# Patient Record
Sex: FEMALE | Race: BLACK OR AFRICAN AMERICAN | Employment: OTHER | URBAN - METROPOLITAN AREA
[De-identification: names, ages, dates, MRNs, and addresses within clinical notes are randomized per-mention and may not be internally consistent; named-entity substitution may affect disease eponyms.]

---

## 2017-10-03 ENCOUNTER — HOSPITAL ENCOUNTER (OUTPATIENT)
Dept: MAMMOGRAPHY | Age: 72
Discharge: HOME | End: 2017-10-03
Attending: FAMILY MEDICINE
Payer: MEDICARE

## 2017-10-03 DIAGNOSIS — Z12.31 VISIT FOR SCREENING MAMMOGRAM: ICD-10-CM

## 2017-10-03 PROCEDURE — 77067 SCR MAMMO BI INCL CAD: CPT

## 2019-03-11 ENCOUNTER — HOSPITAL ENCOUNTER (OUTPATIENT)
Dept: ULTRASOUND IMAGING | Age: 74
Discharge: HOME | End: 2019-03-11
Attending: FAMILY MEDICINE
Payer: MEDICARE

## 2019-03-11 DIAGNOSIS — M79.604 RIGHT LEG PAIN: ICD-10-CM

## 2019-03-11 DIAGNOSIS — R60.9 SWELLING: ICD-10-CM

## 2019-03-11 PROCEDURE — 93971 EXTREMITY STUDY: CPT

## 2022-10-29 ENCOUNTER — APPOINTMENT (OUTPATIENT)
Dept: GENERAL RADIOLOGY | Age: 77
DRG: 493 | End: 2022-10-29
Attending: ORTHOPAEDIC SURGERY
Payer: MEDICARE

## 2022-10-29 ENCOUNTER — APPOINTMENT (OUTPATIENT)
Dept: GENERAL RADIOLOGY | Age: 77
DRG: 493 | End: 2022-10-29
Attending: EMERGENCY MEDICINE
Payer: MEDICARE

## 2022-10-29 ENCOUNTER — APPOINTMENT (OUTPATIENT)
Dept: CT IMAGING | Age: 77
DRG: 493 | End: 2022-10-29
Attending: EMERGENCY MEDICINE
Payer: MEDICARE

## 2022-10-29 ENCOUNTER — HOSPITAL ENCOUNTER (INPATIENT)
Age: 77
LOS: 3 days | Discharge: NURSING, REHAB OR LONG-TERM CARE | DRG: 493 | End: 2022-11-02
Attending: EMERGENCY MEDICINE | Admitting: STUDENT IN AN ORGANIZED HEALTH CARE EDUCATION/TRAINING PROGRAM
Payer: MEDICARE

## 2022-10-29 DIAGNOSIS — S82.831A CLOSED FRACTURE OF DISTAL END OF RIGHT FIBULA, UNSPECIFIED FRACTURE MORPHOLOGY, INITIAL ENCOUNTER: Primary | ICD-10-CM

## 2022-10-29 DIAGNOSIS — R06.02 SOB (SHORTNESS OF BREATH): ICD-10-CM

## 2022-10-29 DIAGNOSIS — S82.891A CLOSED FRACTURE OF RIGHT ANKLE, INITIAL ENCOUNTER: ICD-10-CM

## 2022-10-29 DIAGNOSIS — R07.9 CHEST PAIN, UNSPECIFIED TYPE: ICD-10-CM

## 2022-10-29 DIAGNOSIS — R03.0 ELEVATED BLOOD PRESSURE READING: ICD-10-CM

## 2022-10-29 LAB
ALBUMIN SERPL-MCNC: 2.7 G/DL (ref 3.5–5)
ALBUMIN/GLOB SERPL: 0.5 (ref 1.1–2.2)
ALP SERPL-CCNC: 84 U/L (ref 45–117)
ALT SERPL-CCNC: 29 U/L (ref 12–78)
ANION GAP SERPL CALC-SCNC: 8 MMOL/L (ref 5–15)
AST SERPL-CCNC: 34 U/L (ref 15–37)
BASOPHILS # BLD: 0 K/UL (ref 0–0.1)
BASOPHILS NFR BLD: 1 % (ref 0–1)
BILIRUB SERPL-MCNC: 0.9 MG/DL (ref 0.2–1)
BNP SERPL-MCNC: 343 PG/ML
BUN SERPL-MCNC: 13 MG/DL (ref 6–20)
BUN/CREAT SERPL: 16 (ref 12–20)
CALCIUM SERPL-MCNC: 10 MG/DL (ref 8.5–10.1)
CHLORIDE SERPL-SCNC: 98 MMOL/L (ref 97–108)
CO2 SERPL-SCNC: 34 MMOL/L (ref 21–32)
CREAT SERPL-MCNC: 0.8 MG/DL (ref 0.55–1.02)
DIFFERENTIAL METHOD BLD: ABNORMAL
EOSINOPHIL # BLD: 0 K/UL (ref 0–0.4)
EOSINOPHIL NFR BLD: 0 % (ref 0–7)
ERYTHROCYTE [DISTWIDTH] IN BLOOD BY AUTOMATED COUNT: 13.1 % (ref 11.5–14.5)
GLOBULIN SER CALC-MCNC: 5 G/DL (ref 2–4)
GLUCOSE SERPL-MCNC: 117 MG/DL (ref 65–100)
HCT VFR BLD AUTO: 35.7 % (ref 35–47)
HGB BLD-MCNC: 11.3 G/DL (ref 11.5–16)
IMM GRANULOCYTES # BLD AUTO: 0 K/UL (ref 0–0.04)
IMM GRANULOCYTES NFR BLD AUTO: 0 % (ref 0–0.5)
LYMPHOCYTES # BLD: 1 K/UL (ref 0.8–3.5)
LYMPHOCYTES NFR BLD: 12 % (ref 12–49)
MCH RBC QN AUTO: 28 PG (ref 26–34)
MCHC RBC AUTO-ENTMCNC: 31.7 G/DL (ref 30–36.5)
MCV RBC AUTO: 88.4 FL (ref 80–99)
MONOCYTES # BLD: 0.6 K/UL (ref 0–1)
MONOCYTES NFR BLD: 7 % (ref 5–13)
NEUTS SEG # BLD: 6.8 K/UL (ref 1.8–8)
NEUTS SEG NFR BLD: 80 % (ref 32–75)
NRBC # BLD: 0 K/UL (ref 0–0.01)
NRBC BLD-RTO: 0 PER 100 WBC
PLATELET # BLD AUTO: 210 K/UL (ref 150–400)
PMV BLD AUTO: 11.7 FL (ref 8.9–12.9)
POTASSIUM SERPL-SCNC: 3.1 MMOL/L (ref 3.5–5.1)
PROT SERPL-MCNC: 7.7 G/DL (ref 6.4–8.2)
RBC # BLD AUTO: 4.04 M/UL (ref 3.8–5.2)
SODIUM SERPL-SCNC: 140 MMOL/L (ref 136–145)
TROPONIN-HIGH SENSITIVITY: 11 NG/L (ref 0–51)
WBC # BLD AUTO: 8.5 K/UL (ref 3.6–11)

## 2022-10-29 PROCEDURE — 73610 X-RAY EXAM OF ANKLE: CPT

## 2022-10-29 PROCEDURE — 85025 COMPLETE CBC W/AUTO DIFF WBC: CPT

## 2022-10-29 PROCEDURE — 71045 X-RAY EXAM CHEST 1 VIEW: CPT

## 2022-10-29 PROCEDURE — 94761 N-INVAS EAR/PLS OXIMETRY MLT: CPT

## 2022-10-29 PROCEDURE — 36415 COLL VENOUS BLD VENIPUNCTURE: CPT

## 2022-10-29 PROCEDURE — 93005 ELECTROCARDIOGRAM TRACING: CPT

## 2022-10-29 PROCEDURE — 84484 ASSAY OF TROPONIN QUANT: CPT

## 2022-10-29 PROCEDURE — 99285 EMERGENCY DEPT VISIT HI MDM: CPT

## 2022-10-29 PROCEDURE — 74011250636 HC RX REV CODE- 250/636: Performed by: EMERGENCY MEDICINE

## 2022-10-29 PROCEDURE — 83880 ASSAY OF NATRIURETIC PEPTIDE: CPT

## 2022-10-29 PROCEDURE — 80053 COMPREHEN METABOLIC PANEL: CPT

## 2022-10-29 PROCEDURE — 96374 THER/PROPH/DIAG INJ IV PUSH: CPT

## 2022-10-29 RX ORDER — MORPHINE SULFATE 2 MG/ML
4 INJECTION, SOLUTION INTRAMUSCULAR; INTRAVENOUS
Status: DISPENSED | OUTPATIENT
Start: 2022-10-29 | End: 2022-10-30

## 2022-10-29 RX ORDER — NALOXONE HYDROCHLORIDE 0.4 MG/ML
0.4 INJECTION, SOLUTION INTRAMUSCULAR; INTRAVENOUS; SUBCUTANEOUS AS NEEDED
Status: DISCONTINUED | OUTPATIENT
Start: 2022-10-29 | End: 2022-11-02 | Stop reason: HOSPADM

## 2022-10-29 RX ORDER — OXYCODONE HYDROCHLORIDE 5 MG/1
2.5 TABLET ORAL
Status: DISCONTINUED | OUTPATIENT
Start: 2022-10-29 | End: 2022-11-02 | Stop reason: HOSPADM

## 2022-10-29 RX ORDER — FENTANYL CITRATE 50 UG/ML
50 INJECTION, SOLUTION INTRAMUSCULAR; INTRAVENOUS
Status: COMPLETED | OUTPATIENT
Start: 2022-10-29 | End: 2022-10-29

## 2022-10-29 RX ORDER — ACETAMINOPHEN 325 MG/1
650 TABLET ORAL EVERY 6 HOURS
Status: DISCONTINUED | OUTPATIENT
Start: 2022-10-29 | End: 2022-11-02 | Stop reason: HOSPADM

## 2022-10-29 RX ORDER — SODIUM CHLORIDE 0.9 % (FLUSH) 0.9 %
5-40 SYRINGE (ML) INJECTION AS NEEDED
Status: DISCONTINUED | OUTPATIENT
Start: 2022-10-29 | End: 2022-10-30

## 2022-10-29 RX ORDER — SODIUM CHLORIDE 0.9 % (FLUSH) 0.9 %
5-40 SYRINGE (ML) INJECTION EVERY 8 HOURS
Status: DISCONTINUED | OUTPATIENT
Start: 2022-10-29 | End: 2022-10-30

## 2022-10-29 RX ORDER — AMOXICILLIN 250 MG
2 CAPSULE ORAL 2 TIMES DAILY
Status: DISCONTINUED | OUTPATIENT
Start: 2022-10-30 | End: 2022-11-02 | Stop reason: HOSPADM

## 2022-10-29 RX ORDER — OXYCODONE HYDROCHLORIDE 5 MG/1
5 TABLET ORAL
Status: DISCONTINUED | OUTPATIENT
Start: 2022-10-29 | End: 2022-11-02 | Stop reason: HOSPADM

## 2022-10-29 RX ADMIN — FENTANYL CITRATE 50 MCG: 50 INJECTION INTRAMUSCULAR; INTRAVENOUS at 21:16

## 2022-10-29 NOTE — ED NOTES
Assumed care of pt. Pt reports a fall a week ago, was seen at the time-right ankle break, given walking boot, still have bilat feet pain unable to bear wt on either foot with generalized weakness and fatigue and loss of appetite not eaten since yesterday and having increased short of air-hx of asthma does not feel the same and using inhaler with no relief.  Pt AOX4, PWD, VSS, pain 8/10 in bilat feet, right foot/ankle swollen-pedal pulses strong and equal.

## 2022-10-30 ENCOUNTER — APPOINTMENT (OUTPATIENT)
Dept: CT IMAGING | Age: 77
DRG: 493 | End: 2022-10-30
Attending: EMERGENCY MEDICINE
Payer: MEDICARE

## 2022-10-30 ENCOUNTER — APPOINTMENT (OUTPATIENT)
Dept: GENERAL RADIOLOGY | Age: 77
DRG: 493 | End: 2022-10-30
Attending: ORTHOPAEDIC SURGERY
Payer: MEDICARE

## 2022-10-30 ENCOUNTER — ANESTHESIA EVENT (OUTPATIENT)
Dept: SURGERY | Age: 77
DRG: 493 | End: 2022-10-30
Payer: MEDICARE

## 2022-10-30 ENCOUNTER — ANESTHESIA (OUTPATIENT)
Dept: SURGERY | Age: 77
DRG: 493 | End: 2022-10-30
Payer: MEDICARE

## 2022-10-30 PROBLEM — S82.891A CLOSED RIGHT ANKLE FRACTURE: Status: ACTIVE | Noted: 2022-10-30

## 2022-10-30 LAB
ABO + RH BLD: NORMAL
ANION GAP SERPL CALC-SCNC: 7 MMOL/L (ref 5–15)
APPEARANCE UR: CLEAR
ATRIAL RATE: 68 BPM
BACTERIA URNS QL MICRO: ABNORMAL /HPF
BASOPHILS # BLD: 0 K/UL (ref 0–0.1)
BASOPHILS NFR BLD: 1 % (ref 0–1)
BILIRUB UR QL CFM: NEGATIVE
BLOOD GROUP ANTIBODIES SERPL: NORMAL
BUN SERPL-MCNC: 14 MG/DL (ref 6–20)
BUN/CREAT SERPL: 17 (ref 12–20)
CALCIUM SERPL-MCNC: 10.2 MG/DL (ref 8.5–10.1)
CALCULATED P AXIS, ECG09: 48 DEGREES
CALCULATED R AXIS, ECG10: 15 DEGREES
CALCULATED T AXIS, ECG11: -84 DEGREES
CHLORIDE SERPL-SCNC: 98 MMOL/L (ref 97–108)
CO2 SERPL-SCNC: 35 MMOL/L (ref 21–32)
COLOR UR: ABNORMAL
CREAT SERPL-MCNC: 0.81 MG/DL (ref 0.55–1.02)
DIAGNOSIS, 93000: NORMAL
DIFFERENTIAL METHOD BLD: ABNORMAL
EOSINOPHIL # BLD: 0 K/UL (ref 0–0.4)
EOSINOPHIL NFR BLD: 1 % (ref 0–7)
EPITH CASTS URNS QL MICRO: ABNORMAL /LPF
ERYTHROCYTE [DISTWIDTH] IN BLOOD BY AUTOMATED COUNT: 13.2 % (ref 11.5–14.5)
GLUCOSE SERPL-MCNC: 98 MG/DL (ref 65–100)
GLUCOSE UR STRIP.AUTO-MCNC: NEGATIVE MG/DL
HCT VFR BLD AUTO: 35.6 % (ref 35–47)
HGB BLD-MCNC: 11.1 G/DL (ref 11.5–16)
HGB UR QL STRIP: NEGATIVE
HYALINE CASTS URNS QL MICRO: ABNORMAL /LPF (ref 0–2)
IMM GRANULOCYTES # BLD AUTO: 0 K/UL (ref 0–0.04)
IMM GRANULOCYTES NFR BLD AUTO: 0 % (ref 0–0.5)
INR PPP: 1.1 (ref 0.9–1.1)
KETONES UR QL STRIP.AUTO: 15 MG/DL
LEUKOCYTE ESTERASE UR QL STRIP.AUTO: NEGATIVE
LYMPHOCYTES # BLD: 1.4 K/UL (ref 0.8–3.5)
LYMPHOCYTES NFR BLD: 18 % (ref 12–49)
MAGNESIUM SERPL-MCNC: 1.8 MG/DL (ref 1.6–2.4)
MCH RBC QN AUTO: 28.4 PG (ref 26–34)
MCHC RBC AUTO-ENTMCNC: 31.2 G/DL (ref 30–36.5)
MCV RBC AUTO: 91 FL (ref 80–99)
MONOCYTES # BLD: 0.7 K/UL (ref 0–1)
MONOCYTES NFR BLD: 9 % (ref 5–13)
NEUTS SEG # BLD: 5.9 K/UL (ref 1.8–8)
NEUTS SEG NFR BLD: 71 % (ref 32–75)
NITRITE UR QL STRIP.AUTO: NEGATIVE
NRBC # BLD: 0 K/UL (ref 0–0.01)
NRBC BLD-RTO: 0 PER 100 WBC
P-R INTERVAL, ECG05: 134 MS
PH UR STRIP: 5.5 (ref 5–8)
PLATELET # BLD AUTO: 214 K/UL (ref 150–400)
PMV BLD AUTO: 12.1 FL (ref 8.9–12.9)
POTASSIUM SERPL-SCNC: 3.4 MMOL/L (ref 3.5–5.1)
PROT UR STRIP-MCNC: ABNORMAL MG/DL
PROTHROMBIN TIME: 11.4 SEC (ref 9–11.1)
Q-T INTERVAL, ECG07: 412 MS
QRS DURATION, ECG06: 70 MS
QTC CALCULATION (BEZET), ECG08: 438 MS
RBC # BLD AUTO: 3.91 M/UL (ref 3.8–5.2)
RBC #/AREA URNS HPF: ABNORMAL /HPF (ref 0–5)
SODIUM SERPL-SCNC: 140 MMOL/L (ref 136–145)
SP GR UR REFRACTOMETRY: 1.02 (ref 1–1.03)
SPECIMEN EXP DATE BLD: NORMAL
UA: UC IF INDICATED,UAUC: ABNORMAL
UROBILINOGEN UR QL STRIP.AUTO: 1 EU/DL (ref 0.2–1)
VENTRICULAR RATE, ECG03: 68 BPM
WBC # BLD AUTO: 8.1 K/UL (ref 3.6–11)
WBC URNS QL MICRO: ABNORMAL /HPF (ref 0–4)

## 2022-10-30 PROCEDURE — C1713 ANCHOR/SCREW BN/BN,TIS/BN: HCPCS | Performed by: ORTHOPAEDIC SURGERY

## 2022-10-30 PROCEDURE — 76000 FLUOROSCOPY <1 HR PHYS/QHP: CPT

## 2022-10-30 PROCEDURE — 77030003862 HC BIT DRL SYNT -B: Performed by: ORTHOPAEDIC SURGERY

## 2022-10-30 PROCEDURE — 74011250637 HC RX REV CODE- 250/637: Performed by: ORTHOPAEDIC SURGERY

## 2022-10-30 PROCEDURE — 76010000138 HC OR TIME 0.5 TO 1 HR: Performed by: ORTHOPAEDIC SURGERY

## 2022-10-30 PROCEDURE — 74011250637 HC RX REV CODE- 250/637: Performed by: NURSE PRACTITIONER

## 2022-10-30 PROCEDURE — 77030026438 HC STYL ET INTUB CARD -A: Performed by: STUDENT IN AN ORGANIZED HEALTH CARE EDUCATION/TRAINING PROGRAM

## 2022-10-30 PROCEDURE — 2709999900 HC NON-CHARGEABLE SUPPLY: Performed by: ORTHOPAEDIC SURGERY

## 2022-10-30 PROCEDURE — 80048 BASIC METABOLIC PNL TOTAL CA: CPT

## 2022-10-30 PROCEDURE — 76060000032 HC ANESTHESIA 0.5 TO 1 HR: Performed by: ORTHOPAEDIC SURGERY

## 2022-10-30 PROCEDURE — 77030013079 HC BLNKT BAIR HGGR 3M -A: Performed by: STUDENT IN AN ORGANIZED HEALTH CARE EDUCATION/TRAINING PROGRAM

## 2022-10-30 PROCEDURE — 86900 BLOOD TYPING SEROLOGIC ABO: CPT

## 2022-10-30 PROCEDURE — 76210000006 HC OR PH I REC 0.5 TO 1 HR: Performed by: ORTHOPAEDIC SURGERY

## 2022-10-30 PROCEDURE — 77030039497 HC CST PAD STERILE CHCS -A: Performed by: ORTHOPAEDIC SURGERY

## 2022-10-30 PROCEDURE — 99231 SBSQ HOSP IP/OBS SF/LOW 25: CPT | Performed by: ORTHOPAEDIC SURGERY

## 2022-10-30 PROCEDURE — 74011000258 HC RX REV CODE- 258: Performed by: ORTHOPAEDIC SURGERY

## 2022-10-30 PROCEDURE — 77030008771 HC TU NG SALEM SUMP -A: Performed by: STUDENT IN AN ORGANIZED HEALTH CARE EDUCATION/TRAINING PROGRAM

## 2022-10-30 PROCEDURE — 74011250636 HC RX REV CODE- 250/636: Performed by: STUDENT IN AN ORGANIZED HEALTH CARE EDUCATION/TRAINING PROGRAM

## 2022-10-30 PROCEDURE — 74011250637 HC RX REV CODE- 250/637: Performed by: STUDENT IN AN ORGANIZED HEALTH CARE EDUCATION/TRAINING PROGRAM

## 2022-10-30 PROCEDURE — 0QSJ04Z REPOSITION RIGHT FIBULA WITH INTERNAL FIXATION DEVICE, OPEN APPROACH: ICD-10-PCS | Performed by: ORTHOPAEDIC SURGERY

## 2022-10-30 PROCEDURE — 77030031139 HC SUT VCRL2 J&J -A: Performed by: ORTHOPAEDIC SURGERY

## 2022-10-30 PROCEDURE — 74011250636 HC RX REV CODE- 250/636: Performed by: ORTHOPAEDIC SURGERY

## 2022-10-30 PROCEDURE — 36415 COLL VENOUS BLD VENIPUNCTURE: CPT

## 2022-10-30 PROCEDURE — 77030002916 HC SUT ETHLN J&J -A: Performed by: ORTHOPAEDIC SURGERY

## 2022-10-30 PROCEDURE — 77030008684 HC TU ET CUF COVD -B: Performed by: STUDENT IN AN ORGANIZED HEALTH CARE EDUCATION/TRAINING PROGRAM

## 2022-10-30 PROCEDURE — 81001 URINALYSIS AUTO W/SCOPE: CPT

## 2022-10-30 PROCEDURE — 74011000250 HC RX REV CODE- 250: Performed by: STUDENT IN AN ORGANIZED HEALTH CARE EDUCATION/TRAINING PROGRAM

## 2022-10-30 PROCEDURE — 65270000029 HC RM PRIVATE

## 2022-10-30 PROCEDURE — 73600 X-RAY EXAM OF ANKLE: CPT

## 2022-10-30 PROCEDURE — 71275 CT ANGIOGRAPHY CHEST: CPT

## 2022-10-30 PROCEDURE — 74011250637 HC RX REV CODE- 250/637: Performed by: EMERGENCY MEDICINE

## 2022-10-30 PROCEDURE — 74011000636 HC RX REV CODE- 636: Performed by: STUDENT IN AN ORGANIZED HEALTH CARE EDUCATION/TRAINING PROGRAM

## 2022-10-30 PROCEDURE — 83735 ASSAY OF MAGNESIUM: CPT

## 2022-10-30 PROCEDURE — 3E0T3BZ INTRODUCTION OF ANESTHETIC AGENT INTO PERIPHERAL NERVES AND PLEXI, PERCUTANEOUS APPROACH: ICD-10-PCS | Performed by: ORTHOPAEDIC SURGERY

## 2022-10-30 PROCEDURE — 77030000032 HC CUF TRNQT ZIMM -B: Performed by: ORTHOPAEDIC SURGERY

## 2022-10-30 PROCEDURE — 85610 PROTHROMBIN TIME: CPT

## 2022-10-30 PROCEDURE — 85025 COMPLETE CBC W/AUTO DIFF WBC: CPT

## 2022-10-30 DEVICE — 3.5MM STARDRIVE CORTEX SCREW SELF-TAPPING 14MM: Type: IMPLANTABLE DEVICE | Site: ANKLE | Status: FUNCTIONAL

## 2022-10-30 DEVICE — 3.5MM STARDRIVE CORTEX SCREW SELF-TAPPING 12MM: Type: IMPLANTABLE DEVICE | Site: ANKLE | Status: FUNCTIONAL

## 2022-10-30 DEVICE — 3.5MM STARDRIVE CORTEX SCREW SELF-TAPPING 22MM: Type: IMPLANTABLE DEVICE | Site: ANKLE | Status: FUNCTIONAL

## 2022-10-30 DEVICE — LCP ONE-THIRD TUBULAR PLATE WITH COLLAR 7 HOLES/81MM
Type: IMPLANTABLE DEVICE | Site: ANKLE | Status: FUNCTIONAL
Brand: LCP

## 2022-10-30 DEVICE — SCREW BNE L50MM DIA3.5MM CORT S STL ST FULL THRD LOK T15: Type: IMPLANTABLE DEVICE | Site: ANKLE | Status: FUNCTIONAL

## 2022-10-30 DEVICE — 4.0MM CANCELLOUS BONE SCREW FULLY THREADED/16MM: Type: IMPLANTABLE DEVICE | Site: ANKLE | Status: FUNCTIONAL

## 2022-10-30 RX ORDER — ONDANSETRON 2 MG/ML
INJECTION INTRAMUSCULAR; INTRAVENOUS AS NEEDED
Status: DISCONTINUED | OUTPATIENT
Start: 2022-10-30 | End: 2022-10-30 | Stop reason: HOSPADM

## 2022-10-30 RX ORDER — FENTANYL CITRATE 50 UG/ML
25 INJECTION, SOLUTION INTRAMUSCULAR; INTRAVENOUS
Status: DISCONTINUED | OUTPATIENT
Start: 2022-10-30 | End: 2022-10-30 | Stop reason: HOSPADM

## 2022-10-30 RX ORDER — POTASSIUM CHLORIDE 750 MG/1
20 TABLET, FILM COATED, EXTENDED RELEASE ORAL
Status: COMPLETED | OUTPATIENT
Start: 2022-10-30 | End: 2022-10-30

## 2022-10-30 RX ORDER — LIDOCAINE HYDROCHLORIDE 20 MG/ML
INJECTION, SOLUTION EPIDURAL; INFILTRATION; INTRACAUDAL; PERINEURAL AS NEEDED
Status: DISCONTINUED | OUTPATIENT
Start: 2022-10-30 | End: 2022-10-30 | Stop reason: HOSPADM

## 2022-10-30 RX ORDER — SODIUM CHLORIDE, SODIUM LACTATE, POTASSIUM CHLORIDE, CALCIUM CHLORIDE 600; 310; 30; 20 MG/100ML; MG/100ML; MG/100ML; MG/100ML
INJECTION, SOLUTION INTRAVENOUS
Status: DISCONTINUED | OUTPATIENT
Start: 2022-10-30 | End: 2022-10-30 | Stop reason: HOSPADM

## 2022-10-30 RX ORDER — ACETAMINOPHEN 325 MG/1
650 TABLET ORAL
Status: DISCONTINUED | OUTPATIENT
Start: 2022-10-30 | End: 2022-11-02 | Stop reason: HOSPADM

## 2022-10-30 RX ORDER — ACETAMINOPHEN 650 MG/1
650 SUPPOSITORY RECTAL
Status: DISCONTINUED | OUTPATIENT
Start: 2022-10-30 | End: 2022-11-02 | Stop reason: HOSPADM

## 2022-10-30 RX ORDER — TRIAMTERENE/HYDROCHLOROTHIAZID 37.5-25 MG
1 TABLET ORAL DAILY
Status: DISCONTINUED | OUTPATIENT
Start: 2022-10-30 | End: 2022-11-02 | Stop reason: HOSPADM

## 2022-10-30 RX ORDER — MAGNESIUM SULFATE 1 G/100ML
1 INJECTION INTRAVENOUS ONCE
Status: COMPLETED | OUTPATIENT
Start: 2022-10-30 | End: 2022-10-30

## 2022-10-30 RX ORDER — SODIUM CHLORIDE 0.9 % (FLUSH) 0.9 %
5-40 SYRINGE (ML) INJECTION EVERY 8 HOURS
Status: DISCONTINUED | OUTPATIENT
Start: 2022-10-30 | End: 2022-11-02 | Stop reason: HOSPADM

## 2022-10-30 RX ORDER — FENTANYL CITRATE 50 UG/ML
INJECTION, SOLUTION INTRAMUSCULAR; INTRAVENOUS AS NEEDED
Status: DISCONTINUED | OUTPATIENT
Start: 2022-10-30 | End: 2022-10-30 | Stop reason: HOSPADM

## 2022-10-30 RX ORDER — EPHEDRINE SULFATE/0.9% NACL/PF 50 MG/5 ML
SYRINGE (ML) INTRAVENOUS AS NEEDED
Status: DISCONTINUED | OUTPATIENT
Start: 2022-10-30 | End: 2022-10-30 | Stop reason: HOSPADM

## 2022-10-30 RX ORDER — HYDROMORPHONE HYDROCHLORIDE 1 MG/ML
0.2 INJECTION, SOLUTION INTRAMUSCULAR; INTRAVENOUS; SUBCUTANEOUS
Status: DISCONTINUED | OUTPATIENT
Start: 2022-10-30 | End: 2022-10-30 | Stop reason: HOSPADM

## 2022-10-30 RX ORDER — ENOXAPARIN SODIUM 100 MG/ML
30 INJECTION SUBCUTANEOUS 2 TIMES DAILY
Status: DISCONTINUED | OUTPATIENT
Start: 2022-10-30 | End: 2022-11-02 | Stop reason: HOSPADM

## 2022-10-30 RX ORDER — ALBUTEROL SULFATE 0.83 MG/ML
2.5 SOLUTION RESPIRATORY (INHALATION)
Status: DISCONTINUED | OUTPATIENT
Start: 2022-10-30 | End: 2022-11-02 | Stop reason: HOSPADM

## 2022-10-30 RX ORDER — PROPOFOL 10 MG/ML
INJECTION, EMULSION INTRAVENOUS AS NEEDED
Status: DISCONTINUED | OUTPATIENT
Start: 2022-10-30 | End: 2022-10-30 | Stop reason: HOSPADM

## 2022-10-30 RX ORDER — ROPIVACAINE HYDROCHLORIDE 5 MG/ML
INJECTION, SOLUTION EPIDURAL; INFILTRATION; PERINEURAL AS NEEDED
Status: DISCONTINUED | OUTPATIENT
Start: 2022-10-30 | End: 2022-10-30 | Stop reason: HOSPADM

## 2022-10-30 RX ORDER — ALBUTEROL SULFATE 90 UG/1
2 AEROSOL, METERED RESPIRATORY (INHALATION)
Status: DISCONTINUED | OUTPATIENT
Start: 2022-10-30 | End: 2022-10-30 | Stop reason: CLARIF

## 2022-10-30 RX ORDER — POTASSIUM CHLORIDE 20 MEQ/1
20 TABLET, EXTENDED RELEASE ORAL ONCE
Status: COMPLETED | OUTPATIENT
Start: 2022-10-30 | End: 2022-10-30

## 2022-10-30 RX ORDER — POLYETHYLENE GLYCOL 3350 17 G/17G
17 POWDER, FOR SOLUTION ORAL DAILY PRN
Status: DISCONTINUED | OUTPATIENT
Start: 2022-10-30 | End: 2022-11-02 | Stop reason: HOSPADM

## 2022-10-30 RX ORDER — FENTANYL CITRATE 50 UG/ML
INJECTION, SOLUTION INTRAMUSCULAR; INTRAVENOUS
Status: DISPENSED
Start: 2022-10-30 | End: 2022-10-30

## 2022-10-30 RX ORDER — CEFAZOLIN SODIUM 1 G/3ML
INJECTION, POWDER, FOR SOLUTION INTRAMUSCULAR; INTRAVENOUS AS NEEDED
Status: DISCONTINUED | OUTPATIENT
Start: 2022-10-30 | End: 2022-10-30 | Stop reason: HOSPADM

## 2022-10-30 RX ORDER — DEXMEDETOMIDINE HYDROCHLORIDE 100 UG/ML
INJECTION, SOLUTION INTRAVENOUS AS NEEDED
Status: DISCONTINUED | OUTPATIENT
Start: 2022-10-30 | End: 2022-10-30 | Stop reason: HOSPADM

## 2022-10-30 RX ORDER — NADOLOL 40 MG/1
40 TABLET ORAL DAILY
Status: DISCONTINUED | OUTPATIENT
Start: 2022-10-30 | End: 2022-11-02 | Stop reason: HOSPADM

## 2022-10-30 RX ORDER — ONDANSETRON 4 MG/1
4 TABLET, ORALLY DISINTEGRATING ORAL
Status: DISCONTINUED | OUTPATIENT
Start: 2022-10-30 | End: 2022-11-02 | Stop reason: HOSPADM

## 2022-10-30 RX ORDER — WATER FOR INJECTION,STERILE
VIAL (ML) INJECTION
Status: DISPENSED
Start: 2022-10-30 | End: 2022-10-30

## 2022-10-30 RX ORDER — CEFAZOLIN SODIUM 1 G/3ML
INJECTION, POWDER, FOR SOLUTION INTRAMUSCULAR; INTRAVENOUS
Status: COMPLETED
Start: 2022-10-30 | End: 2022-10-30

## 2022-10-30 RX ORDER — PHENYLEPHRINE HCL IN 0.9% NACL 0.4MG/10ML
SYRINGE (ML) INTRAVENOUS AS NEEDED
Status: DISCONTINUED | OUTPATIENT
Start: 2022-10-30 | End: 2022-10-30 | Stop reason: HOSPADM

## 2022-10-30 RX ORDER — OXYCODONE HYDROCHLORIDE 5 MG/1
5 TABLET ORAL AS NEEDED
Status: DISCONTINUED | OUTPATIENT
Start: 2022-10-30 | End: 2022-10-30 | Stop reason: HOSPADM

## 2022-10-30 RX ORDER — ROCURONIUM BROMIDE 10 MG/ML
INJECTION, SOLUTION INTRAVENOUS AS NEEDED
Status: DISCONTINUED | OUTPATIENT
Start: 2022-10-30 | End: 2022-10-30 | Stop reason: HOSPADM

## 2022-10-30 RX ORDER — ONDANSETRON 2 MG/ML
4 INJECTION INTRAMUSCULAR; INTRAVENOUS
Status: DISCONTINUED | OUTPATIENT
Start: 2022-10-30 | End: 2022-11-02 | Stop reason: HOSPADM

## 2022-10-30 RX ORDER — ROPIVACAINE HYDROCHLORIDE 5 MG/ML
INJECTION, SOLUTION EPIDURAL; INFILTRATION; PERINEURAL
Status: COMPLETED | OUTPATIENT
Start: 2022-10-30 | End: 2022-10-30

## 2022-10-30 RX ORDER — ONDANSETRON 2 MG/ML
4 INJECTION INTRAMUSCULAR; INTRAVENOUS AS NEEDED
Status: DISCONTINUED | OUTPATIENT
Start: 2022-10-30 | End: 2022-10-30 | Stop reason: HOSPADM

## 2022-10-30 RX ORDER — ACETAMINOPHEN 500 MG
1000 TABLET ORAL ONCE
Status: DISCONTINUED | OUTPATIENT
Start: 2022-10-30 | End: 2022-10-30 | Stop reason: HOSPADM

## 2022-10-30 RX ORDER — SUCCINYLCHOLINE CHLORIDE 20 MG/ML
INJECTION INTRAMUSCULAR; INTRAVENOUS AS NEEDED
Status: DISCONTINUED | OUTPATIENT
Start: 2022-10-30 | End: 2022-10-30 | Stop reason: HOSPADM

## 2022-10-30 RX ORDER — SODIUM CHLORIDE 0.9 % (FLUSH) 0.9 %
5-40 SYRINGE (ML) INJECTION AS NEEDED
Status: DISCONTINUED | OUTPATIENT
Start: 2022-10-30 | End: 2022-11-02 | Stop reason: HOSPADM

## 2022-10-30 RX ADMIN — ONDANSETRON HYDROCHLORIDE 4 MG: 2 INJECTION, SOLUTION INTRAMUSCULAR; INTRAVENOUS at 09:45

## 2022-10-30 RX ADMIN — ACETAMINOPHEN 650 MG: 325 TABLET ORAL at 17:33

## 2022-10-30 RX ADMIN — SODIUM CHLORIDE, SODIUM LACTATE, POTASSIUM CHLORIDE, AND CALCIUM CHLORIDE: 600; 310; 30; 20 INJECTION, SOLUTION INTRAVENOUS at 09:45

## 2022-10-30 RX ADMIN — ACETAMINOPHEN 650 MG: 325 TABLET ORAL at 00:08

## 2022-10-30 RX ADMIN — CEFAZOLIN 2 G: 1 INJECTION, POWDER, FOR SOLUTION INTRAMUSCULAR; INTRAVENOUS; PARENTERAL at 09:59

## 2022-10-30 RX ADMIN — FENTANYL CITRATE 50 MCG: 50 INJECTION, SOLUTION INTRAMUSCULAR; INTRAVENOUS at 09:52

## 2022-10-30 RX ADMIN — Medication 3 AMPULE: at 08:55

## 2022-10-30 RX ADMIN — SUGAMMADEX 300 MG: 100 INJECTION, SOLUTION INTRAVENOUS at 10:36

## 2022-10-30 RX ADMIN — SODIUM CHLORIDE, PRESERVATIVE FREE 10 ML: 5 INJECTION INTRAVENOUS at 05:33

## 2022-10-30 RX ADMIN — CEFAZOLIN 3 G: 10 INJECTION, POWDER, FOR SOLUTION INTRAVENOUS at 17:42

## 2022-10-30 RX ADMIN — SODIUM CHLORIDE, PRESERVATIVE FREE 10 ML: 5 INJECTION INTRAVENOUS at 21:44

## 2022-10-30 RX ADMIN — ROCURONIUM BROMIDE 5 MG: 10 INJECTION INTRAVENOUS at 09:52

## 2022-10-30 RX ADMIN — SODIUM CHLORIDE, PRESERVATIVE FREE 10 ML: 5 INJECTION INTRAVENOUS at 14:43

## 2022-10-30 RX ADMIN — OXYCODONE 5 MG: 5 TABLET ORAL at 21:42

## 2022-10-30 RX ADMIN — OXYCODONE 5 MG: 5 TABLET ORAL at 11:35

## 2022-10-30 RX ADMIN — ACETAMINOPHEN 650 MG: 325 TABLET ORAL at 05:33

## 2022-10-30 RX ADMIN — TRIAMTERENE AND HYDROCHLOROTHIAZIDE 1 TABLET: 37.5; 25 TABLET ORAL at 08:33

## 2022-10-30 RX ADMIN — POTASSIUM CHLORIDE 20 MEQ: 750 TABLET, FILM COATED, EXTENDED RELEASE ORAL at 05:45

## 2022-10-30 RX ADMIN — SENNOSIDES AND DOCUSATE SODIUM 2 TABLET: 50; 8.6 TABLET ORAL at 08:33

## 2022-10-30 RX ADMIN — FENTANYL CITRATE 50 MCG: 50 INJECTION, SOLUTION INTRAMUSCULAR; INTRAVENOUS at 10:04

## 2022-10-30 RX ADMIN — DEXMEDETOMIDINE HYDROCHLORIDE 4 MCG: 100 INJECTION, SOLUTION, CONCENTRATE INTRAVENOUS at 10:02

## 2022-10-30 RX ADMIN — Medication 200 MCG: at 09:58

## 2022-10-30 RX ADMIN — MAGNESIUM SULFATE HEPTAHYDRATE 1 G: 1 INJECTION, SOLUTION INTRAVENOUS at 05:51

## 2022-10-30 RX ADMIN — ROPIVACAINE HYDROCHLORIDE 20 ML: 5 INJECTION, SOLUTION EPIDURAL; INFILTRATION; PERINEURAL at 09:20

## 2022-10-30 RX ADMIN — IOPAMIDOL 100 ML: 755 INJECTION, SOLUTION INTRAVENOUS at 01:44

## 2022-10-30 RX ADMIN — ENOXAPARIN SODIUM 30 MG: 100 INJECTION SUBCUTANEOUS at 21:43

## 2022-10-30 RX ADMIN — PROPOFOL 140 MG: 10 INJECTION, EMULSION INTRAVENOUS at 09:52

## 2022-10-30 RX ADMIN — LIDOCAINE HYDROCHLORIDE 60 MG: 20 INJECTION, SOLUTION EPIDURAL; INFILTRATION; INTRACAUDAL; PERINEURAL at 09:52

## 2022-10-30 RX ADMIN — SUCCINYLCHOLINE CHLORIDE 150 MG: 20 INJECTION, SOLUTION INTRAMUSCULAR; INTRAVENOUS at 09:52

## 2022-10-30 RX ADMIN — SUGAMMADEX 100 MG: 100 INJECTION, SOLUTION INTRAVENOUS at 10:38

## 2022-10-30 RX ADMIN — POTASSIUM CHLORIDE 20 MEQ: 1500 TABLET, EXTENDED RELEASE ORAL at 08:33

## 2022-10-30 RX ADMIN — PROPOFOL 25 MG: 10 INJECTION, EMULSION INTRAVENOUS at 09:15

## 2022-10-30 RX ADMIN — FENTANYL CITRATE 25 MCG: 50 INJECTION INTRAMUSCULAR; INTRAVENOUS at 11:21

## 2022-10-30 RX ADMIN — ACETAMINOPHEN 650 MG: 325 TABLET ORAL at 23:47

## 2022-10-30 RX ADMIN — Medication 5 MG: at 10:00

## 2022-10-30 RX ADMIN — POTASSIUM BICARBONATE 50 MEQ: 391 TABLET, EFFERVESCENT ORAL at 00:07

## 2022-10-30 RX ADMIN — ACETAMINOPHEN 650 MG: 325 TABLET ORAL at 12:11

## 2022-10-30 RX ADMIN — Medication 1 AMPULE: at 23:49

## 2022-10-30 RX ADMIN — NADOLOL 40 MG: 40 TABLET ORAL at 08:33

## 2022-10-30 RX ADMIN — POLYETHYLENE GLYCOL 3350 17 G: 17 POWDER, FOR SOLUTION ORAL at 15:09

## 2022-10-30 RX ADMIN — SENNOSIDES AND DOCUSATE SODIUM 2 TABLET: 50; 8.6 TABLET ORAL at 17:33

## 2022-10-30 NOTE — ANESTHESIA PROCEDURE NOTES
Peripheral Block    Start time: 10/30/2022 9:10 AM  End time: 10/30/2022 9:20 AM  Performed by: Ngoc Tate MD  Authorized by: Ngoc Tate MD       Pre-procedure: Indications: at surgeon's request and post-op pain management    Preanesthetic Checklist: patient identified, risks and benefits discussed, site marked, timeout performed, anesthesia consent given and patient being monitored    Timeout Time: 09:08 EDT      Block Type:   Block Type:  Popliteal  Laterality:  Right  Monitoring:  Standard ASA monitoring, continuous pulse ox, frequent vital sign checks, heart rate, responsive to questions and oxygen  Injection Technique:  Single shot  Procedures: ultrasound guided and nerve stimulator    Patient Position: supine  Prep: chlorhexidine    : 10 cm above the popliteal fossa & tibial tuberosity. Needle Type:  Stimuplex  Needle Gauge:  22 G  Needle Localization:  Ultrasound guidance and nerve stimulator  Medication Injected:  Ropivacaine (PF) (NAROPIN)(0.5%) 5 mg/mL injection - Peripheral Nerve Block   20 mL - 10/30/2022 9:20:00 AM  Med Admin Time: 10/30/2022 9:26 AM    Assessment:  Number of attempts:  1  Injection Assessment:  Incremental injection every 5 mL, local visualized surrounding nerve on ultrasound, negative aspiration for blood, no intravascular symptoms and no paresthesia  Patient tolerance:  Patient tolerated the procedure well with no immediate complications  Under ultrasound guidance, a 22 gauge needle was inserted and placed in close proximity to the nerve. Ultrasound was also used to visualize the spread of the anesthetic in close proximity to the nerve being blocked. The nerve appeared anatomicaly normal.  A permanent ultrasound image was saved in the patient's record.

## 2022-10-30 NOTE — H&P
This note is compiled to communicate with the medical care team. It may contain sensitive and protected information. It is not intended to serve as a source of communication with patients/families/friends; that communication occurs at the bedside or via alternative direct communications means (secure messaging, letters, video/telephone, etc.). Hospitalist Admission Note    NAME: Bala Chadwick   :  1945   MRN:  [id number]     Date/Time:  10/30/2022 1:18 AM    Patient PCP: Korina De Paz MD  ______________________________________________________________________  Given the patient's current clinical presentation, I have a high level of concern for decompensation if discharged from the emergency department. Complex decision making was performed, which includes reviewing the patient's available past medical records, laboratory results, and x-ray films. My assessment of this patient's clinical condition and my plan of care is as follows. Subjective:   CHIEF COMPLAINT: Ankle pain    HISTORY OF PRESENT ILLNESS:     Johnna Jo is a 78 y.o.  female with pertinent past medical history as listed below who presents with complaints of right greater than left ankle pain. Patient reports experiencing mechanical fall 1 week prior at which time she was evaluated at outside emergency department found to have right ankle fracture and was placed in walking boot, but reports this was too uncomfortable to tolerate and has remained in bed for the past week as she reports inability to bear weight on either ankle secondary to pain. Today, patient reports episode of shortness of breath with central pain of moderate intensity lasting approximately 1 hour prior to spontaneously resolving. At present, patient reports no chest discomfort or change from baseline respiratory status. ROS otherwise negative.     In the ED, patient afebrile and hemodynamically stable (hypertensive 150s/90s) saturating upper 90s on room air. ECG demonstrates normal sinus rhythm without definitive ischemic changes. CXR demonstrates no acute process. Right ankle radiographs demonstrate fracture with dislocation status post splinting which demonstrates lateral malleolus fracture with widened medial mortise piece. Left ankle radiographs demonstrate no acute abnormality. Orthopedics consulted in the ED recommending medicine admission with tentative plans for ORIF in the morning. Labs demonstrate: Unremarkable CBC, proBNP 343, sodium 140, potassium 3.1, CO2 34 (chronic), BUN 13, creatinine 0.80, high sensitive troponin 11, INR 1.1. CTA chest pending radiologist read. We were asked to admit for work up and evaluation of the above problems. Past Medical History:   Diagnosis Date    Arthritis     bursitis    Asthma     Chronic pain     feet,hips    Family history of colon cancer 2/12/2015    mother    GERD (gastroesophageal reflux disease)     H/O bone density study 7/30/12    nml    Hyperlipidemia 5/8/2012    Hypertension     Sleep apnea     no cpap        Past Surgical History:   Procedure Laterality Date    COLONOSCOPY  2006    nml    EGD  2006    HX CHOLECYSTECTOMY  08/18/2016    Lap alber    HX DILATION AND CURETTAGE      HX PEPPER AND BSO      HX TONSILLECTOMY      HX TUBAL LIGATION      KRISHNA MAMMOGRAM SCREENING BILATERAL  7/30/12       Social History     Tobacco Use    Smoking status: Never    Smokeless tobacco: Never   Substance Use Topics    Alcohol use:  Yes     Alcohol/week: 1.0 standard drink     Types: 1 Glasses of wine per week     Comment: rare/every 6 months        Family History   Problem Relation Age of Onset    Cancer Mother 80        BREAST    Hypertension Mother     Breast Cancer Mother     Hypertension Father     Stroke Father     Cancer Sister 76        BREAST    Mult Sclerosis Sister     Breast Cancer Sister     Diabetes Other     Alcohol abuse Neg Hx     OSTEOARTHRITIS Neg Hx Asthma Neg Hx     Bleeding Prob Neg Hx     Elevated Lipids Neg Hx     Headache Neg Hx     Heart Disease Neg Hx     Lung Disease Neg Hx     Migraines Neg Hx     Psychiatric Disorder Neg Hx     Mental Retardation Neg Hx        Allergies   Allergen Reactions    Sulfa (Sulfonamide Antibiotics) Rash        Prior to Admission medications    Medication Sig Start Date End Date Taking? Authorizing Provider   oxyCODONE-acetaminophen (PERCOCET) 5-325 mg per tablet Take 1-2 Tabs by mouth every four (4) hours as needed for Pain. Max Daily Amount: 12 Tabs. 8/18/16  Yes Armand VILLALTA MD   naproxen sodium 220 mg cap Take  by mouth as needed. Yes Provider, Historical   triamterene-hydrochlorothiazide (DYAZIDE) 37.5-25 mg per capsule Take 1 Cap by mouth daily. 7/2/13  Yes Marcella Salguero MD   nadolol (CORGARD) 40 mg tablet Take 1 Tab by mouth daily. 7/2/13  Yes Marcella Salguero MD   albuterol (PROVENTIL, VENTOLIN) 90 mcg/actuation inhaler Take 2 Puffs by inhalation every four (4) hours as needed. 5/14/13  Yes Marcella Salguero MD   promethazine (PHENERGAN) 12.5 mg tablet Take 1 Tab by mouth every six (6) hours as needed for Nausea.  8/18/16   Tova Baguh MD       REVIEW OF SYSTEMS:       Total of 12 systems reviewed as follows:       POSITIVE= Red text   General: Fever, chills, sweats, weakness/malaise, weight loss/gain, loss of appetite    Eyes:   Vision change, eye pain   ENT:    Rhinorrhea, pharyngitis, Hearing loss    Respiratory:   cough, sputum production, SOB, LOPEZ, wheezing, pleuritic pain    Cardiology:   chest pain, palpitations, orthopnea, edema, syncope    Gastrointestinal:  abdominal pain , N/V, diarrhea, dysphagia, constipation, bleeding    Genitourinary:  frequency, urgency, dysuria, hematuria, incontinence    Muskuloskeletal:  arthralgia, myalgia, back pain   Hematology:  easy bruising, nose or gum bleeding, lymphadenopathy    Dermatological: rash, ulceration, pruritis, color change / jaundice   Endocrine: hot flashes or polydipsia    Neurological:  headache, dizziness, confusion, focal weakness, paresthesia, Speech difficulties, memory loss, gait difficulty   Psychological: Feelings of anxiety, depression, agitation       Objective:   VITALS:    Visit Vitals  BP (!) 152/93   Pulse 70   Temp 98.9 °F (37.2 °C)   Resp 19   Ht 5' 3\" (1.6 m)   Wt 122.2 kg (269 lb 6.4 oz)   SpO2 100%   BMI 47.72 kg/m²       PHYSICAL EXAM:    General:   Alert, cooperative, no distress, morbidly obese elderly -American female        HEENT:  Atraumatic, anicteric sclerae, pink conjunctivae, mucosa moist, dentition fair, partial dentures removed     Neck:  Supple, symmetrical, trachea midline, no abnormalities on palpation     Lungs:   CTA B, diminished breath sounds in bases. Symmetric expansion. Good aeration. Normal respiratory effort. Chest wall:  No tenderness. No Accessory muscle use. Cardiovascular:   RRR, No murmur/rub/gallop. No JVD. Radial/AT/DP pulse 2+     GI/:   Protuberant/obese, soft, non-tender. Not distended. Bowel sounds normal. No HSM     Extremities No edema. No cyanosis. Capillary refill normal, RLE in splint exposed toes neurovascularly intact, LLE neurovascularly intact diffusely tender on palpation of ankle joint line most notably overlying ATFL and pain is elicited with posterior drawer and ankle inversion testing no joint instability appreciated     Skin: No significant lesions noted. Not Jaundiced. No rashes      Neurologic: PERRL. EOMI. No facial asymmetry. No aphasia or slurred speech. Moves all extremities. Sensation to light touch grossly intact BUE/BLE. No overt focal deficits appreciated     Psych:  Alert and oriented X 4. Normal affect.  Good insight     Other:   N/A         LAB DATA REVIEWED:    Recent Results (from the past 24 hour(s))   CBC WITH AUTOMATED DIFF    Collection Time: 10/29/22  8:43 PM   Result Value Ref Range    WBC 8.5 3.6 - 11.0 K/uL    RBC 4.04 3.80 - 5.20 M/uL    HGB 11.3 (L) 11.5 - 16.0 g/dL    HCT 35.7 35.0 - 47.0 %    MCV 88.4 80.0 - 99.0 FL    MCH 28.0 26.0 - 34.0 PG    MCHC 31.7 30.0 - 36.5 g/dL    RDW 13.1 11.5 - 14.5 %    PLATELET 016 953 - 195 K/uL    MPV 11.7 8.9 - 12.9 FL    NRBC 0.0 0  WBC    ABSOLUTE NRBC 0.00 0.00 - 0.01 K/uL    NEUTROPHILS 80 (H) 32 - 75 %    LYMPHOCYTES 12 12 - 49 %    MONOCYTES 7 5 - 13 %    EOSINOPHILS 0 0 - 7 %    BASOPHILS 1 0 - 1 %    IMMATURE GRANULOCYTES 0 0.0 - 0.5 %    ABS. NEUTROPHILS 6.8 1.8 - 8.0 K/UL    ABS. LYMPHOCYTES 1.0 0.8 - 3.5 K/UL    ABS. MONOCYTES 0.6 0.0 - 1.0 K/UL    ABS. EOSINOPHILS 0.0 0.0 - 0.4 K/UL    ABS. BASOPHILS 0.0 0.0 - 0.1 K/UL    ABS. IMM. GRANS. 0.0 0.00 - 0.04 K/UL    DF AUTOMATED     METABOLIC PANEL, COMPREHENSIVE    Collection Time: 10/29/22  8:43 PM   Result Value Ref Range    Sodium 140 136 - 145 mmol/L    Potassium 3.1 (L) 3.5 - 5.1 mmol/L    Chloride 98 97 - 108 mmol/L    CO2 34 (H) 21 - 32 mmol/L    Anion gap 8 5 - 15 mmol/L    Glucose 117 (H) 65 - 100 mg/dL    BUN 13 6 - 20 MG/DL    Creatinine 0.80 0.55 - 1.02 MG/DL    BUN/Creatinine ratio 16 12 - 20      eGFR >60 >60 ml/min/1.73m2    Calcium 10.0 8.5 - 10.1 MG/DL    Bilirubin, total 0.9 0.2 - 1.0 MG/DL    ALT (SGPT) 29 12 - 78 U/L    AST (SGOT) 34 15 - 37 U/L    Alk.  phosphatase 84 45 - 117 U/L    Protein, total 7.7 6.4 - 8.2 g/dL    Albumin 2.7 (L) 3.5 - 5.0 g/dL    Globulin 5.0 (H) 2.0 - 4.0 g/dL    A-G Ratio 0.5 (L) 1.1 - 2.2     TROPONIN-HIGH SENSITIVITY    Collection Time: 10/29/22  8:43 PM   Result Value Ref Range    Troponin-High Sensitivity 11 0 - 51 ng/L   NT-PRO BNP    Collection Time: 10/29/22  8:43 PM   Result Value Ref Range    NT pro- <450 PG/ML   EKG, 12 LEAD, INITIAL    Collection Time: 10/29/22  8:54 PM   Result Value Ref Range    Ventricular Rate 68 BPM    Atrial Rate 68 BPM    P-R Interval 134 ms    QRS Duration 70 ms    Q-T Interval 412 ms    QTC Calculation (Bezet) 438 ms    Calculated P Axis 48 degrees    Calculated R Axis 15 degrees    Calculated T Axis -84 degrees    Diagnosis       Normal sinus rhythm  ST & T wave abnormality, consider inferior ischemia  ST & T wave abnormality, consider anterolateral ischemia  When compared with ECG of 16-AUG-2016 11:07,  Inverted T waves have replaced nonspecific T wave abnormality in Anterior   leads     PROTHROMBIN TIME + INR    Collection Time: 10/30/22 12:28 AM   Result Value Ref Range    INR 1.1 0.9 - 1.1      Prothrombin time 11.4 (H) 9.0 - 11.1 sec       IMAGING:  CXR-no acute process on portable chest    Left ankle radiographs-no acute abnormality    Right ankle radiographs-fracture dislocation    Repeat right ankle radiographs-lateral malleolus fracture with widened medial which he is redemonstrated and fiberglass splint    EKG: Normal sinus rhythm with nonspecific T wave changes, rate 68, , QTc 438  ______________________________________________________________________    Assessment / Plan:  Right ankle fracture  Suspected left ankle sprain of ATFL  Hypokalemia  Obesity class III by BMI 47.7 with suspected obesity hypoventilation syndrome  History asthma  RENEA not on CPAP  Essential hypertension    PLAN:    Admit to medicine  Follow-up CTA chest to ensure no PE  Orthopedics consulted, greatly appreciate their expertise  -N.p.o. and DVT chemoprophylaxis held in anticipation of ORIF  -Defer DVT chemoprophylaxis and pain management to orthopedics  Continue PTA: Nadolol, Dyazide for hypertension  Albuterol inhaler every 4 hours as needed shortness of breath/wheezing  As needed MiraLAX for bowel regimen-escalate as needed while on opioids  Recheck BMP and check mag in the morning  -48 M EQ repletion ordered in ED  Patient declined inpatient CPAP-reports she has had 3 studies but is not prescribed a CPAP at present as she was instructed to come back for repeat testing-likely for bilevel evaluation  -Noted chronic bicarb elevation likely compensatory for chronic CO2 retention in setting of super morbid obesity and pickwickian syndrome    Surgical clearance:  RCRI: 0 points-3.9% 30-day risk of death, MI, or cardiac arrest  -Await CTA results to ensure no PE present and repeat BMP to ensure hypokalemia resolved prior to operative intervention; if both are acceptable operative risk deemed low/intermediate  -Has tolerated anesthesia in the past without issue-May be difficult intubation given Mallampati score 4, short thyroid mental distance, limited neck extension, and large body habitus    Code Status: Full    DVT Prophylaxis: Defer to orthopedics  GI Prophylaxis: Not indicated  Diet: N.p.o.       Care Plan discussed with:    Comments   Patient x    Family      RN     Care Manager                    Consultant:      _______________________________________________________________________  Baseline Level of Function: Independent    Expected  Disposition:   Home with Family    HH/PT/OT/RN    SNF/LTC x   JUAN    ________________________________________________________________________  TOTAL TIME:  50 Minutes      Comments    x Reviewed previous records   >50% of visit spent in counseling and coordination of care x Discussion with patient and/or family and questions answered       ________________________________________________________________________  Signed: Alexandra Austin DO  10/30/2022 1:18 AM    Please note that this documentation was completed in part with Dragon dictation software. Occasionally unanticipated grammatical, syntax, homophones, and other interpretive errors are inadvertently transcribed by the computer software. Please excuse and disregard any errors that have escaped final proofreading. If in doubt, please do not hesitate to reach out to myself or the assigned hospitalist via [facility] paging system for clarification.

## 2022-10-30 NOTE — PERIOP NOTES
TRANSFER - IN REPORT:    Verbal report received from Cori MEZA(name) on Guido Koroma  being received from 3255(unit) for ordered procedure      Report consisted of patients Situation, Background, Assessment and   Recommendations(SBAR). Information from the following report(s) SBAR, Intake/Output, MAR, and Recent Results was reviewed with the receiving nurse. Opportunity for questions and clarification was provided. Assessment completed upon patients arrival to unit and care assumed.

## 2022-10-30 NOTE — PROGRESS NOTES
TRANSFER - IN REPORT:    Verbal report received from Glenys Olson RN(name) on [employer]  being received from ED(unit) for routine progression of care      Report consisted of patients Situation, Background, Assessment and   Recommendations(SBAR). Information from the following report(s) SBAR, Kardex, ED Summary, Procedure Summary, Intake/Output, MAR, Recent Results, and Cardiac Rhythm NSR  was reviewed with the receiving nurse. Opportunity for questions and clarification was provided. Assessment completed upon patients arrival to unit and care assumed.

## 2022-10-30 NOTE — PROGRESS NOTES
Noted ortho recommendations and pt is scheduled for ankle fracture repair today. Will defer but continue to follow.

## 2022-10-30 NOTE — PROGRESS NOTES
Hospitalist Progress Note    NAME: Iliana Rushing   :  1945   MRN:  [id number]       Assessment / Plan:  Right ankle fracture  Suspected left ankle sprain of ATFL  -Appreciate Ortho input   -S/p ORIF today   -PT/OT consults in AM   -Case Management to assist with DC planning, will likely need SNF vs IPR   -Continue bowel regimen   -PRN pain management with oxycodone and morphine   -Encourage IS    Hypokalemia  -K 3.4  -Replete with PO   -Trend BMP     Obesity class III by BMI 47.7 with suspected obesity hypoventilation syndrome  History asthma  RENEA not on CPAP  -CTA ruled out PE  -Incidentally showed Chest wall structures show a 1.2 x 1.4 cm left axillary lymph node  Recommend OP follow up   -Noted chronic bicarb elevation likely compensatory for chronic CO2 retention in setting of super morbid obesity and pickwickian syndrome  Patient declined inpatient CPAP-reports she has had 3 studies but is not prescribed a CPAP at present as she was instructed to come back for repeat testing-likely for bilevel evaluation  -PRN nebs  -Monitor respiratory status    Essential hypertension  -Continue PTA: Nadolol, Dyazide for hypertension  -Monitor BP, slightly elevated this AM likely rlt to pain     40 or above Morbid obesity / Body mass index is 47.72 kg/m². Estimated discharge date:   Barriers: SNF vs IPR     Code status: Full  Prophylaxis: Lovenox  Recommended Disposition:  SNF vs IPR      Subjective:     Chief Complaint / Reason for Physician Visit  Patient seen at bedside post surgery. No complaints. Discussed plan of care as above, patient verbalized understanding/agreement. Discussed with RN events overnight.      Review of Systems:  Symptom Y/N Comments  Symptom Y/N Comments   Fever/Chills    Chest Pain     Poor Appetite    Edema     Cough    Abdominal Pain     Sputum    Joint Pain y    SOB/LOPEZ    Pruritis/Rash     Nausea/vomit    Tolerating PT/OT     Diarrhea    Tolerating Diet y    Constipation Other       Could NOT obtain due to:      Objective:     VITALS:   Last 24hrs VS reviewed since prior progress note. Most recent are:  Patient Vitals for the past 24 hrs:   Temp Pulse Resp BP SpO2   10/30/22 1600 97.3 °F (36.3 °C) 70 18 136/84 98 %   10/30/22 1500 98.2 °F (36.8 °C) 75 18 125/68 98 %   10/30/22 1400 98 °F (36.7 °C) 68 18 (!) 142/88 95 %   10/30/22 1300 97.9 °F (36.6 °C) 77 18 (!) 170/93 96 %   10/30/22 1202 97.9 °F (36.6 °C) 76 18 (!) 181/99 100 %   10/30/22 1135 99 °F (37.2 °C) 69 12 (!) 138/94 99 %   10/30/22 1130 -- 69 10 (!) 139/95 98 %   10/30/22 1120 -- 76 15 139/65 95 %   10/30/22 1115 -- 75 13 (!) 152/83 98 %   10/30/22 1105 -- 70 14 (!) 150/88 100 %   10/30/22 1100 -- 76 15 (!) 82/62 99 %   10/30/22 1055 -- 71 13 -- 99 %   10/30/22 1050 -- 72 20 120/80 99 %   10/30/22 1047 99 °F (37.2 °C) 69 16 131/85 100 %   10/30/22 1045 99 °F (37.2 °C) 66 12 131/85 100 %   10/30/22 0845 97.8 °F (36.6 °C) 69 19 135/77 99 %   10/30/22 0757 97.6 °F (36.4 °C) 68 16 128/65 100 %   10/30/22 0252 99.2 °F (37.3 °C) 74 20 (!) 164/84 96 %   10/29/22 2152 -- 70 -- (!) 152/93 100 %   10/29/22 2052 -- 69 -- (!) 146/105 100 %   10/29/22 2022 -- 64 -- 122/73 97 %   10/29/22 2007 -- 74 -- (!) 190/77 99 %   10/29/22 1952 -- 70 -- (!) 180/80 --   10/29/22 1937 -- 74 -- (!) 173/78 99 %   10/29/22 1922 98.9 °F (37.2 °C) 76 19 (!) 149/95 98 %       Intake/Output Summary (Last 24 hours) at 10/30/2022 1644  Last data filed at 10/30/2022 1041  Gross per 24 hour   Intake 500 ml   Output --   Net 500 ml        I had a face to face encounter and independently examined this patient on 10/30/2022, as outlined below:  PHYSICAL EXAM:  General: WD, WN. Alert, cooperative, no acute distress    EENT:  EOMI. Anicteric sclerae. MMM  Resp:  CTA bilaterally, no wheezing or rales. No accessory muscle use  CV:  Regular  rhythm,  No edema  GI:  Soft, obese, Non tender.   +Bowel sounds  Neurologic:  Alert and oriented X 3, normal speech, Psych:   Good insight. Not anxious nor agitated  Skin:  No rashes. No jaundice    Reviewed most current lab test results and cultures  YES  Reviewed most current radiology test results   YES  Review and summation of old records today    NO  Reviewed patient's current orders and MAR    YES  PMH/SH reviewed - no change compared to H&P  ________________________________________________________________________  Care Plan discussed with:    Comments   Patient x    Family      RN     Care Manager x    Consultant                        Multidiciplinary team rounds were held today with , nursing, pharmacist and clinical coordinator. Patient's plan of care was discussed; medications were reviewed and discharge planning was addressed. ________________________________________________________________________  Total NON critical care TIME:  35   Minutes    Total CRITICAL CARE TIME Spent:   Minutes non procedure based      Comments   >50% of visit spent in counseling and coordination of care x    ________________________________________________________________________  Chandler Alonzo NP     Procedures: see electronic medical records for all procedures/Xrays and details which were not copied into this note but were reviewed prior to creation of Plan. LABS:  I reviewed today's most current labs and imaging studies.   Pertinent labs include:  Recent Labs     10/30/22  0312 10/29/22  2043   WBC 8.1 8.5   HGB 11.1* 11.3*   HCT 35.6 35.7    210     Recent Labs     10/30/22  0312 10/30/22  0028 10/29/22  2043     --  140   K 3.4*  --  3.1*   CL 98  --  98   CO2 35*  --  34*   GLU 98  --  117*   BUN 14  --  13   CREA 0.81  --  0.80   CA 10.2*  --  10.0   MG 1.8  --   --    ALB  --   --  2.7*   TBILI  --   --  0.9   ALT  --   --  29   INR  --  1.1  --        Signed: Chandler Alonzo NP

## 2022-10-30 NOTE — ANESTHESIA PREPROCEDURE EVALUATION
Anesthetic History   No history of anesthetic complications            Review of Systems / Medical History  Patient summary reviewed, nursing notes reviewed and pertinent labs reviewed    Pulmonary        Sleep apnea: No treatment    Asthma : well controlled       Neuro/Psych   Within defined limits        Pertinent negatives: No seizures and CVA   Cardiovascular    Hypertension: well controlled          Hyperlipidemia      Comments: EKG (10/2022):    Normal sinus rhythm   ST & T wave abnormality, consider inferior ischemia   ST & T wave abnormality, consider anterolateral ischemia          GI/Hepatic/Renal     GERD: well controlled           Endo/Other        Morbid obesity and arthritis  Pertinent negatives: No diabetes   Other Findings            Physical Exam    Airway  Mallampati: IV  TM Distance: 4 - 6 cm  Neck ROM: short neck   Mouth opening: Diminished (comment)    Comments: Prior airway (2016): Attempt(s) 1;  DLV 1; Direct Laryngoscopy, Stylet, Rapid Sequence, Cricoid Pressure; Blade: [place]utton; 3; Endotracheal, cuffed; Oral; ETT Size: 7 mm; Cardiovascular    Rhythm: regular  Rate: normal         Dental    Dentition: Full upper dentures and Lower dentition intact     Pulmonary  Breath sounds clear to auscultation               Abdominal  GI exam deferred       Other Findings            Anesthetic Plan    ASA: 3  Anesthesia type: general    Monitoring Plan: BIS  Post-op pain plan if not by surgeon: regional and peripheral nerve block single    Induction: Intravenous  Anesthetic plan and risks discussed with: Patient      Potential REYNALDO, have video laryngoscopy available.  Minimize long acting narcotics and benzodiazepines

## 2022-10-30 NOTE — CONSULTS
ORTHO  CONSULT    Subjective:     Date of Consultation:  October 30, 2022    Referring Physician:  BRANT    Toni Sharma is a 78 y.o.  female with pertinent past medical history as listed below who presents with complaints of right greater than left ankle pain. Patient reports experiencing mechanical fall 1 week prior at which time she was evaluated at outside emergency department found to have right ankle fracture and was placed in walking boot, but reports this was too uncomfortable to tolerate and has remained in bed for the past week as she reports inability to bear weight on either ankle secondary to pain. Today, patient reports episode of shortness of breath with central pain of moderate intensity lasting approximately 1 hour prior to spontaneously resolving. At present, patient reports no chest discomfort or change from baseline respiratory status. ROS otherwise negative. Patient Active Problem List    Diagnosis Date Noted    Closed right ankle fracture 10/30/2022    Family history of colon cancer 02/12/2015    Pre-diabetes 07/03/2013    Osteoarthritis 05/08/2012    Hyperlipidemia 05/08/2012    Essential hypertension, benign 01/04/2011    Asthma 01/04/2011     Family History   Problem Relation Age of Onset    Cancer Mother 80        BREAST    Hypertension Mother     Breast Cancer Mother     Hypertension Father     Stroke Father     Cancer Sister 76        BREAST    Mult Sclerosis Sister     Breast Cancer Sister     Diabetes Other     Alcohol abuse Neg Hx     OSTEOARTHRITIS Neg Hx     Asthma Neg Hx     Bleeding Prob Neg Hx     Elevated Lipids Neg Hx     Headache Neg Hx     Heart Disease Neg Hx     Lung Disease Neg Hx     Migraines Neg Hx     Psychiatric Disorder Neg Hx     Mental Retardation Neg Hx       Social History     Tobacco Use    Smoking status: Never    Smokeless tobacco: Never   Substance Use Topics    Alcohol use:  Yes     Alcohol/week: 1.0 standard drink     Types: 1 Glasses of wine per week     Comment: rare/every 6 months     Past Medical History:   Diagnosis Date    Arthritis     bursitis    Asthma     Chronic pain     feet,hips    Family history of colon cancer 02/12/2015    mother    GERD (gastroesophageal reflux disease)     H/O bone density study 07/30/2012    nml    Hyperlipidemia 05/08/2012    Hypertension     Sleep apnea     no cpap      Past Surgical History:   Procedure Laterality Date    COLONOSCOPY  2006    nml    EGD  2006    HX CHOLECYSTECTOMY  08/18/2016    Lap alber    HX DILATION AND CURETTAGE      HX PEPPER AND BSO      HX TONSILLECTOMY      HX TUBAL LIGATION      KRISHNA MAMMOGRAM SCREENING BILATERAL  7/30/12      Prior to Admission medications    Medication Sig Start Date End Date Taking? Authorizing Provider   oxyCODONE-acetaminophen (PERCOCET) 5-325 mg per tablet Take 1-2 Tabs by mouth every four (4) hours as needed for Pain. Max Daily Amount: 12 Tabs. 8/18/16  Yes Santino VILLALTA MD   naproxen sodium 220 mg cap Take  by mouth as needed. Yes Provider, Historical   triamterene-hydrochlorothiazide (DYAZIDE) 37.5-25 mg per capsule Take 1 Cap by mouth daily. 7/2/13  Yes Dionicio Koch MD   nadolol (CORGARD) 40 mg tablet Take 1 Tab by mouth daily. 7/2/13  Yes Dionicio Koch MD   albuterol (PROVENTIL, VENTOLIN) 90 mcg/actuation inhaler Take 2 Puffs by inhalation every four (4) hours as needed. 5/14/13  Yes Dionicio Koch MD   promethazine (PHENERGAN) 12.5 mg tablet Take 1 Tab by mouth every six (6) hours as needed for Nausea.   Patient not taking: Reported on 10/30/2022 8/18/16   Elmer Avina MD     Current Facility-Administered Medications   Medication Dose Route Frequency    albuterol (PROVENTIL HFA, VENTOLIN HFA, PROAIR HFA) inhaler 2 Puff  2 Puff Inhalation Q4H PRN    nadoloL (CORGARD) tablet 40 mg  40 mg Oral DAILY    triamterene-hydroCHLOROthiazide (MAXZIDE) 37.5-25 mg per tablet 1 Tablet  1 Tablet Oral DAILY    sodium chloride (NS) flush 5-40 mL  5-40 mL IntraVENous Q8H    sodium chloride (NS) flush 5-40 mL  5-40 mL IntraVENous PRN    acetaminophen (TYLENOL) tablet 650 mg  650 mg Oral Q6H PRN    Or    acetaminophen (TYLENOL) suppository 650 mg  650 mg Rectal Q6H PRN    polyethylene glycol (MIRALAX) packet 17 g  17 g Oral DAILY PRN    ondansetron (ZOFRAN ODT) tablet 4 mg  4 mg Oral Q8H PRN    Or    ondansetron (ZOFRAN) injection 4 mg  4 mg IntraVENous Q6H PRN    acetaminophen (TYLENOL) tablet 1,000 mg  1,000 mg Oral ONCE    alcohol 62% (NOZIN) nasal  1 Ampule  1 Ampule Topical Q12H    sterile water (preservative free) injection        ceFAZolin (ANCEF) 1 gram injection        morphine injection 4 mg  4 mg IntraVENous ONCE PRN    sodium chloride (NS) flush 5-40 mL  5-40 mL IntraVENous Q8H    sodium chloride (NS) flush 5-40 mL  5-40 mL IntraVENous PRN    acetaminophen (TYLENOL) tablet 650 mg  650 mg Oral Q6H    oxyCODONE IR (ROXICODONE) tablet 2.5 mg  2.5 mg Oral Q4H PRN    oxyCODONE IR (ROXICODONE) tablet 5 mg  5 mg Oral Q4H PRN    naloxone (NARCAN) injection 0.4 mg  0.4 mg IntraVENous PRN    senna-docusate (PERICOLACE) 8.6-50 mg per tablet 2 Tablet  2 Tablet Oral BID     Allergies   Allergen Reactions    Sulfa (Sulfonamide Antibiotics) Rash        Review of Systems:  A comprehensive review of systems was negative. Objective:     Patient Vitals for the past 8 hrs:   BP Temp Pulse Resp SpO2   10/30/22 0757 128/65 97.6 °F (36.4 °C) 68 16 100 %   10/30/22 0252 (!) 164/84 99.2 °F (37.3 °C) 74 20 96 %     Temp (24hrs), Av.6 °F (37 °C), Min:97.6 °F (36.4 °C), Max:99.2 °F (37.3 °C)      Physical Exam:    General:  Alert and oriented. No acute distress. HEENT:  Normocephalic, atraumatic    Chest:  Respirations unlabored    Abdomen:  Soft, non-tender    Extremities:  No evidence of cyanosis. Pulses palpable in both upper and lower extremities. Josie's sign negative in bilateral lower extremities. Moving all extremities. Ttp b/L ankles.   Right more than left.  Pain with ROM. Mild swelling    Neurologic:  No motor deficits. Sensation stable. Neurovascular exam within normal limits. Data Review   Recent Results (from the past 24 hour(s))   CBC WITH AUTOMATED DIFF    Collection Time: 10/29/22  8:43 PM   Result Value Ref Range    WBC 8.5 3.6 - 11.0 K/uL    RBC 4.04 3.80 - 5.20 M/uL    HGB 11.3 (L) 11.5 - 16.0 g/dL    HCT 35.7 35.0 - 47.0 %    MCV 88.4 80.0 - 99.0 FL    MCH 28.0 26.0 - 34.0 PG    MCHC 31.7 30.0 - 36.5 g/dL    RDW 13.1 11.5 - 14.5 %    PLATELET 910 911 - 786 K/uL    MPV 11.7 8.9 - 12.9 FL    NRBC 0.0 0  WBC    ABSOLUTE NRBC 0.00 0.00 - 0.01 K/uL    NEUTROPHILS 80 (H) 32 - 75 %    LYMPHOCYTES 12 12 - 49 %    MONOCYTES 7 5 - 13 %    EOSINOPHILS 0 0 - 7 %    BASOPHILS 1 0 - 1 %    IMMATURE GRANULOCYTES 0 0.0 - 0.5 %    ABS. NEUTROPHILS 6.8 1.8 - 8.0 K/UL    ABS. LYMPHOCYTES 1.0 0.8 - 3.5 K/UL    ABS. MONOCYTES 0.6 0.0 - 1.0 K/UL    ABS. EOSINOPHILS 0.0 0.0 - 0.4 K/UL    ABS. BASOPHILS 0.0 0.0 - 0.1 K/UL    ABS. IMM. GRANS. 0.0 0.00 - 0.04 K/UL    DF AUTOMATED     METABOLIC PANEL, COMPREHENSIVE    Collection Time: 10/29/22  8:43 PM   Result Value Ref Range    Sodium 140 136 - 145 mmol/L    Potassium 3.1 (L) 3.5 - 5.1 mmol/L    Chloride 98 97 - 108 mmol/L    CO2 34 (H) 21 - 32 mmol/L    Anion gap 8 5 - 15 mmol/L    Glucose 117 (H) 65 - 100 mg/dL    BUN 13 6 - 20 MG/DL    Creatinine 0.80 0.55 - 1.02 MG/DL    BUN/Creatinine ratio 16 12 - 20      eGFR >60 >60 ml/min/1.73m2    Calcium 10.0 8.5 - 10.1 MG/DL    Bilirubin, total 0.9 0.2 - 1.0 MG/DL    ALT (SGPT) 29 12 - 78 U/L    AST (SGOT) 34 15 - 37 U/L    Alk.  phosphatase 84 45 - 117 U/L    Protein, total 7.7 6.4 - 8.2 g/dL    Albumin 2.7 (L) 3.5 - 5.0 g/dL    Globulin 5.0 (H) 2.0 - 4.0 g/dL    A-G Ratio 0.5 (L) 1.1 - 2.2     TROPONIN-HIGH SENSITIVITY    Collection Time: 10/29/22  8:43 PM   Result Value Ref Range    Troponin-High Sensitivity 11 0 - 51 ng/L   NT-PRO BNP    Collection Time: 10/29/22 8:43 PM   Result Value Ref Range    NT pro- <450 PG/ML   EKG, 12 LEAD, INITIAL    Collection Time: 10/29/22  8:54 PM   Result Value Ref Range    Ventricular Rate 68 BPM    Atrial Rate 68 BPM    P-R Interval 134 ms    QRS Duration 70 ms    Q-T Interval 412 ms    QTC Calculation (Bezet) 438 ms    Calculated P Axis 48 degrees    Calculated R Axis 15 degrees    Calculated T Axis -84 degrees    Diagnosis       Normal sinus rhythm  ST & T wave abnormality, consider inferior ischemia  ST & T wave abnormality, consider anterolateral ischemia  When compared with ECG of 16-AUG-2016 11:07,  Inverted T waves have replaced nonspecific T wave abnormality in Anterior   leads     PROTHROMBIN TIME + INR    Collection Time: 10/30/22 12:28 AM   Result Value Ref Range    INR 1.1 0.9 - 1.1      Prothrombin time 11.4 (H) 9.0 - 11.1 sec   TYPE & SCREEN    Collection Time: 10/30/22 12:28 AM   Result Value Ref Range    Crossmatch Expiration 11/02/2022,2359     ABO/Rh(D) O POSITIVE     Antibody screen NEG    URINALYSIS W/ REFLEX CULTURE    Collection Time: 10/30/22  2:56 AM    Specimen: Miscellaneous sample; Urine    Urine specimen   Result Value Ref Range    Color DARK YELLOW      Appearance CLEAR CLEAR      Specific gravity 1.025 1.003 - 1.030      pH (UA) 5.5 5.0 - 8.0      Protein TRACE (A) NEG mg/dL    Glucose Negative NEG mg/dL    Ketone 15 (A) NEG mg/dL    Blood Negative NEG      Urobilinogen 1.0 0.2 - 1.0 EU/dL    Nitrites Negative NEG      Leukocyte Esterase Negative NEG      UA:UC IF INDICATED CULTURE NOT INDICATED BY UA RESULT CNI      WBC 0-4 0 - 4 /hpf    RBC 0-5 0 - 5 /hpf    Epithelial cells MODERATE (A) FEW /lpf    Bacteria 1+ (A) NEG /hpf    Hyaline cast 0-2 0 - 2 /lpf   BILIRUBIN, CONFIRM    Collection Time: 10/30/22  2:56 AM   Result Value Ref Range    Bilirubin UA, confirm Negative NEG     METABOLIC PANEL, BASIC    Collection Time: 10/30/22  3:12 AM   Result Value Ref Range    Sodium 140 136 - 145 mmol/L Potassium 3.4 (L) 3.5 - 5.1 mmol/L    Chloride 98 97 - 108 mmol/L    CO2 35 (H) 21 - 32 mmol/L    Anion gap 7 5 - 15 mmol/L    Glucose 98 65 - 100 mg/dL    BUN 14 6 - 20 MG/DL    Creatinine 0.81 0.55 - 1.02 MG/DL    BUN/Creatinine ratio 17 12 - 20      eGFR >60 >60 ml/min/1.73m2    Calcium 10.2 (H) 8.5 - 10.1 MG/DL   CBC WITH AUTOMATED DIFF    Collection Time: 10/30/22  3:12 AM   Result Value Ref Range    WBC 8.1 3.6 - 11.0 K/uL    RBC 3.91 3.80 - 5.20 M/uL    HGB 11.1 (L) 11.5 - 16.0 g/dL    HCT 35.6 35.0 - 47.0 %    MCV 91.0 80.0 - 99.0 FL    MCH 28.4 26.0 - 34.0 PG    MCHC 31.2 30.0 - 36.5 g/dL    RDW 13.2 11.5 - 14.5 %    PLATELET 290 884 - 345 K/uL    MPV 12.1 8.9 - 12.9 FL    NRBC 0.0 0  WBC    ABSOLUTE NRBC 0.00 0.00 - 0.01 K/uL    NEUTROPHILS 71 32 - 75 %    LYMPHOCYTES 18 12 - 49 %    MONOCYTES 9 5 - 13 %    EOSINOPHILS 1 0 - 7 %    BASOPHILS 1 0 - 1 %    IMMATURE GRANULOCYTES 0 0.0 - 0.5 %    ABS. NEUTROPHILS 5.9 1.8 - 8.0 K/UL    ABS. LYMPHOCYTES 1.4 0.8 - 3.5 K/UL    ABS. MONOCYTES 0.7 0.0 - 1.0 K/UL    ABS. EOSINOPHILS 0.0 0.0 - 0.4 K/UL    ABS. BASOPHILS 0.0 0.0 - 0.1 K/UL    ABS. IMM. GRANS. 0.0 0.00 - 0.04 K/UL    DF AUTOMATED     MAGNESIUM    Collection Time: 10/30/22  3:12 AM   Result Value Ref Range    Magnesium 1.8 1.6 - 2.4 mg/dL       XR Results (maximum last 3): Results from [place] encounter on 10/29/22    XR CHEST PORT    Narrative  EXAM:  XR CHEST PORT    INDICATION: Preop evaluation. COMPARISON: Chest x-ray 8/18/2016. TECHNIQUE: Single portable chest AP view    FINDINGS: The cardiac silhouette is within normal limits. The pulmonary  vasculature is within normal limits. The lungs and pleural spaces are clear. The visualized bones and upper abdomen  are age-appropriate. Impression  No acute process on portable chest.      XR ANKLE RT MIN 3 V    Narrative  EXAM: XR ANKLE RT MIN 3 V    INDICATION: post splint. COMPARISON: X-ray of earlier today.     FINDINGS: Three views of the right ankle demonstrate acute lateral malleolus  fracture with widened medial mortise in fiberglass cast. There is no other acute  osseous or articular abnormality. The soft tissues are within normal limits. Impression  Lateral malleolus fracture with widened medial mortise  redemonstrated in fiberglass splint. XR ANKLE RT MIN 3 V    Narrative  EXAM: XR ANKLE RT MIN 3 V    INDICATION: fall, known distal fibula fx. COMPARISON: None. FINDINGS: Three views of the right ankle demonstrate distal fibular fracture,  widening of the medial joint space, diffuse soft tissue swelling. Mild DJD. Calcaneal spurs. Impression  Fracture dislocation. CT Results (maximum last 3): Results from [place] encounter on 10/29/22    CTA CHEST W OR W WO CONT    Narrative  EXAM: CTA CHEST W OR W WO CONT    INDICATION: chest pain, shortness of breath, r/o PE    COMPARISON: Mammogram 10/3/2017 and ultrasound 4/1/2016. Adele Rast CONTRAST: 100 mL of Isovue-370. TECHNIQUE:  Precontrast  images were obtained to localize the volume for acquisition. Multislice helical CT arteriography was performed from the diaphragm to the  thoracic inlet during uneventful rapid bolus intravenous contrast  administration. Lung and soft tissue windows were generated. Coronal and  sagittal images were generated and 3D post processing consisting of coronal  maximum intensity images was performed. CT dose reduction was achieved through  use of a standardized protocol tailored for this examination and automatic  exposure control for dose modulation. FINDINGS:  The lungs are clear of mass, nodule, airspace disease or edema. The pulmonary arteries are well enhanced and no pulmonary emboli are identified. There is no mediastinal or hilar adenopathy or mass. The aorta enhances normally  without evidence of aneurysm or dissection.     The visualized portions of the upper abdominal organs are normal. Chest wall  structures show a 1.2 x 1.4 cm left axillary lymph node. Impression  No pulmonary embolus or other acute cardiopulmonary process. Left  axillary adenopathy. MRI Results (maximum last 3): No results found for this or any previous visit. Nuclear Medicine Results (maximum last 3): No results found for this or any previous visit. US Results (maximum last 3): Results from [place] encounter on 04/01/16    US ABD COMP    Narrative  **Final Report**      ICD Codes / Adm. Diagnosis: 789.00  R10.9 / Abdominal pain, unspecified si  Unspecified abdominal pain  Examination:  Kim Vila  - [id number] - Apr 1 2016  9:48AM  Accession No:  [id number]  Reason:  Abdominal pain      REPORT:  Clinical indication : right upper quadrant pain. Realtime ultrasonography of the abdomen is obtained. Gallbladder shows  mobile stones. There is no pericholecystic fluid tenderness or wall  thickening. Visualized portion of the Pancreas appear unremarkable. The  common duct is normal in prominent in size, 9 mm. . Liver and spleen are  normal in size with no masses. Portal vein flow is hepatopedal. Kidneys are  normal in size and echogenicity without masses or hydronephrosis. Visualized  portion of aorta, inferior vena cava and iliac bifurcation also normal.    Impression  : Cholelithiasis appear prominent size common bile duct. Signing/Reading Doctor: Gertrude Saint ([id number])  Approved: Gertrude Saint ([id number])  Apr 1 2016 10:09AM      DEXA Results (maximum last 3): Results from Hospital Encounter encounter on 07/30/12    DEXA BONE DENSITY STUDY AXIAL    Narrative  **Final Report**      ICD Codes / Adm. Diagnosis: 611.71  611.71 / Unspecified menopausal and pos  Examination:  MM DEXA AXIAL SKELETON  - [id number] - Jul 30 2012  2:04PM  Accession No:  [id number]  Reason:  screening      REPORT:  Bone Mineral Density    Indication: Osteoporosis Screening  Age: 74  Sex: Female.     Menopause status: postmenopausal.  Hormone replacement therapy: No    Risk factors for fall:   None  Risk factors for osteoporosis:  well water, history of fracture    Current medication for osteoporosis: none. Comparison: None    Technique: Imaging was performed on the [employer]. World Health  Organization meta-analysis fracture risk calculator (FRAX) analysis was  performed for 10 year fracture risk probability assessment    Excluded sites: None    Findings:    Fractures identified on Lateral scanogram:  None    Lumbar spine: L1-L4  Bone mineral density (gm/cm2): 1.465  % of peak bone mass: 122  % for age matched controls:  80  T score: 2.2  Z score:  1.9    Hip: Left femoral neck  Bone mineral density (gm/cm2):  1.012  % of peak bone mass: 98  % for age matched controls:  80  T score: -0.2  Z score:  -0.4    Hip: Right femoral neck  Bone mineral density (gm/cm2):  1.032  % of peak bone mass: 99  % for age matched controls:  80  T score: 0  Z score:  -0.3        IMPRESSION:    This patient is normal using the World Health Organization criteria  10 year probability of major osteoporotic fracture:  5.3%  10 year probability of hip fracture:  0.2%          Signing/Reading Doctor: Kelli Small ([id number])  Morteza Gary ([id number])  07/30/2012      [facility] Results (maximum last 3): Results from [place] encounter on 10/03/17    [facility] MAMMO BI SCREENING INCL CAD    Narrative  STUDY: Bilateral digital screening mammogram    INDICATION:  Screening. COMPARISON:  2014, 2012, 2011    BREAST COMPOSITION:  There are scattered areas of fibroglandular density. FINDINGS: Bilateral digital screening mammography was performed and is  interpreted in conjunction with a computer assisted detection (CAD) system. No  suspicious masses or calcifications are identified. There has been no  significant change. Impression  IMPRESSION:  BI-RADS 2: Benign. No mammographic evidence of malignancy.     RECOMMENDATIONS:  Next screening mammogram is recommended in one year. The patient will be notified of these results. Results from [place] encounter on 11/20/14    [facility] MAMMO BI SCREENING DIGTL    Narrative  **Final Report**      ICD Codes / Adm. Diagnosis: 627.9  611.71 / routine screening  Examination:  MM MAMMO DIG SCREEN BI  - [id number] - Nov 20 2014  2:11PM  Accession No:  [id number]  Reason:  SCREENING      REPORT:  STUDY:  Bilateral digital screening mammogram    INDICATION:  Screening    COMPARISON:  2011, 2012    FINDINGS: Bilateral digital screening mammography was performed, and is  interpreted in conjunction with a computer assisted detection (CAD) system. The breast parenchymal pattern is scattered fibroglandular densities. No  new masses or suspicious calcifications are identified. There is no  skin thickening or nipple retraction. Impression  :  No mammographic evidence of malignancy. Next screening mammogram is  recommended in one year. The patient will be notified of these results. BIRADS 2:  Benign. Signing/Reading Doctor: Cordell Rogers ([id number])  Approved: Cordell Rogers ([id number])  Nov 20 2014  2:18PM      Results from [place] encounter on 07/30/12    [facility] MAMMO BI DX DIGTL    Narrative  **Final Report**      ICD Codes / Adm. Diagnosis: 611.71  611.71 / Breast screening, unspecified  Mastodynia  Examination:  MM MAMMO DIG DX BI  - [id number] - Jul 30 2012  2:35PM  Accession No:  [id number]  Reason:  breast pain       HISTORY: Breast pain. Diagnostic bilateral mammogram was performed. No  interval change occurred. REPORT:  STUDY:  Bilateral Digital Screening Mammogram    INDICATION:  Screening    COMPARISON:  2011, 2009, 2005    BREAST COMPOSITION: There are scattered fibroglandular densities  (approximately 25-50% glandular).     FINDINGS: Bilateral digital screening mammography was performed, and is  interpreted in conjunction with a computer assisted detection (CAD) system. There is stable bilateral breast nodularity. . No suspicious masses or  calcifications are identified. There is no skin thickening or nipple  retraction. There has been no significant change. IMPRESSION:    1. BIRADS 2: Benign. 2. No mammographic evidence of malignancy. Next screening mammogram is recommended in one year. The patient will be  notified of these results. **SEE ADDENDUM AT TOP OF THE REPORT**    Signing/Reading Doctor: Shabana Glover ([id number]) Addendum Reading: Shabana Glover ([id number])  Approved: Shabana Glover ([id number])  07/30/2012                      Addendum  Approved: Shabana Glover ([id number])  07/30/2012      IR Results (maximum last 3): No results found for this or any previous visit. VAS/US Results (maximum last 3): No results found for this or any previous visit. PET Results (maximum last 3): No results found for this or any previous visit. Assessment/Plan:     R ankle fracture, left ankle sprain    Patient is now one week out from her initial injury. We discussed tx options. Planning for ORIF. She has been malreduced for one week. We discussed difficulties she may have in healing. The risks of surgery were explained. Risks of infection, blood loss, neurovascular injury, anesthesia risks, and risks secondary to patient comorbidities were explained.      Pain control  Npo  Medically optimized and cleared      Emerson Antoine DO

## 2022-10-30 NOTE — PROGRESS NOTES
Problem: Falls - Risk of  Goal: *Absence of Falls  Description: Document Lopez Blight Fall Risk and appropriate interventions in the flowsheet. Outcome: Progressing Towards Goal  Note: Fall Risk Interventions:            Medication Interventions: Assess postural VS orthostatic hypotension, Evaluate medications/consider consulting pharmacy, Patient to call before getting OOB, Teach patient to arise slowly, Utilize gait belt for transfers/ambulation    Elimination Interventions: Call light in reach, Patient to call for help with toileting needs, Toilet paper/wipes in reach, Toileting schedule/hourly rounds, Stay With Me (per policy), Elevated toilet seat    History of Falls Interventions: Consult care management for discharge planning, Door open when patient unattended, Evaluate medications/consider consulting pharmacy, Room close to nurse's station, Assess for delayed presentation/identification of injury for 48 hrs (comment for end date), Vital signs minimum Q4HRs X 24 hrs (comment for end date), Utilize gait belt for transfer/ambulation         Problem: Patient Education: Go to Patient Education Activity  Goal: Patient/Family Education  Outcome: Progressing Towards Goal     Problem: Pressure Injury - Risk of  Goal: *Prevention of pressure injury  Description: Document Hitesh Scale and appropriate interventions in the flowsheet.   Outcome: Progressing Towards Goal  Note: Pressure Injury Interventions:  Sensory Interventions: Assess changes in LOC, Assess need for specialty bed, Avoid rigorous massage over bony prominences, Check visual cues for pain, Discuss PT/OT consult with provider, Chair cushion, Float heels, Keep linens dry and wrinkle-free, Minimize linen layers, Monitor skin under medical devices, Maintain/enhance activity level, Pad between skin to skin, Pressure redistribution bed/mattress (bed type)    Moisture Interventions: Absorbent underpads, Apply protective barrier, creams and emollients, Assess need for specialty bed, Check for incontinence Q2 hours and as needed, Contain wound drainage, Limit adult briefs, Maintain skin hydration (lotion/cream), Minimize layers, Offer toileting Q_hr, Moisture barrier    Activity Interventions: Assess need for specialty bed, Chair cushion, Increase time out of bed, Pressure redistribution bed/mattress(bed type), PT/OT evaluation    Mobility Interventions: Assess need for specialty bed, Chair cushion, HOB 30 degrees or less, Float heels, Pressure redistribution bed/mattress (bed type), PT/OT evaluation, Turn and reposition approx.  every two hours(pillow and wedges)    Nutrition Interventions: Document food/fluid/supplement intake, Discuss nutritional consult with provider, Offer support with meals,snacks and hydration                     Problem: Patient Education: Go to Patient Education Activity  Goal: Patient/Family Education  Outcome: Progressing Towards Goal     Problem: Patient Education: Go to Patient Education Activity  Goal: Patient/Family Education  Outcome: Progressing Towards Goal     Problem: Lower Extremity Fracture:Day of Admission  Goal: Off Pathway (Use only if patient is Off Pathway)  Outcome: Progressing Towards Goal  Goal: Activity/Safety  Outcome: Progressing Towards Goal  Goal: Consults, if ordered  Outcome: Progressing Towards Goal  Goal: Diagnostic Test/Procedures  Outcome: Progressing Towards Goal  Goal: Nutrition/Diet  Outcome: Progressing Towards Goal  Goal: Medications  Outcome: Progressing Towards Goal  Goal: Respiratory  Outcome: Progressing Towards Goal  Goal: Treatments/Interventions/Procedures  Outcome: Progressing Towards Goal  Goal: Psychosocial  Outcome: Progressing Towards Goal  Goal: *Optimal pain control at patient's stated goal  Outcome: Progressing Towards Goal  Goal: *Hemodynamically stable  Outcome: Progressing Towards Goal  Goal: *Adequate oxygenation  Outcome: Progressing Towards Goal     Problem: Lower Extremity Fracture:Day of Surgery (Intiate SCIP Measures for Post-op Care)  Goal: Off Pathway (Use only if patient is Off Pathway)  Outcome: Progressing Towards Goal  Goal: Activity/Safety  Outcome: Progressing Towards Goal  Goal: Consults, if ordered  Outcome: Progressing Towards Goal  Goal: Diagnostic Test/Procedures  Outcome: Progressing Towards Goal  Goal: Nutrition/Diet  Outcome: Progressing Towards Goal  Goal: Medications  Outcome: Progressing Towards Goal  Goal: Respiratory  Outcome: Progressing Towards Goal  Goal: Treatments/Interventions/Procedures  Outcome: Progressing Towards Goal  Goal: Psychosocial  Outcome: Progressing Towards Goal  Goal: *Optimal pain control at patient's stated goal  Outcome: Progressing Towards Goal  Goal: *Hemodynamically stable  Outcome: Progressing Towards Goal  Goal: *Adequate oxygenation  Outcome: Progressing Towards Goal     Problem: Lower Extremity Fracture:Post-op Day 2  Goal: Off Pathway (Use only if patient is Off Pathway)  Outcome: Progressing Towards Goal  Goal: Activity/Safety  Outcome: Progressing Towards Goal  Goal: Diagnostic Test/Procedures  Outcome: Progressing Towards Goal  Goal: Nutrition/Diet  Outcome: Progressing Towards Goal  Goal: Discharge Planning  Outcome: Progressing Towards Goal  Goal: Medications  Outcome: Progressing Towards Goal  Goal: Respiratory  Outcome: Progressing Towards Goal  Goal: Treatments/Interventions/Procedures  Outcome: Progressing Towards Goal  Goal: Psychosocial  Outcome: Progressing Towards Goal  Goal: *Optimal pain control at patient's stated goal  Outcome: Progressing Towards Goal  Goal: *Hemodynamically stable  Outcome: Progressing Towards Goal  Goal: *Adequate oxygenation  Outcome: Progressing Towards Goal  Goal: *PT/INR within defined limits  Outcome: Progressing Towards Goal  Goal: *Demonstrates progressive activity  Outcome: Progressing Towards Goal  Goal: *Voiding  Outcome: Progressing Towards Goal  Goal: *Bowel movement  Outcome: Progressing Towards Goal  Goal: *Tolerating diet  Outcome: Progressing Towards Goal     Problem: Lower Extremity Fracture:Post-op Day 3  Goal: Off Pathway (Use only if patient is Off Pathway)  Outcome: Progressing Towards Goal  Goal: Activity/Safety  Outcome: Progressing Towards Goal  Goal: Diagnostic Test/Procedures  Outcome: Progressing Towards Goal  Goal: Nutrition/Diet  Outcome: Progressing Towards Goal  Goal: Discharge Planning  Outcome: Progressing Towards Goal  Goal: Medications  Outcome: Progressing Towards Goal  Goal: Respiratory  Outcome: Progressing Towards Goal  Goal: Treatments/Interventions/Procedures  Outcome: Progressing Towards Goal  Goal: Psychosocial  Outcome: Progressing Towards Goal  Goal: *Optimal pain control at patient's stated goal  Outcome: Progressing Towards Goal  Goal: *Hemodynamically stable  Outcome: Progressing Towards Goal  Goal: *Adequate oxygenation  Outcome: Progressing Towards Goal  Goal: *PT/INR within defined limits  Outcome: Progressing Towards Goal  Goal: *Demonstrates progressive activity  Outcome: Progressing Towards Goal  Goal: *Voiding  Outcome: Progressing Towards Goal  Goal: *Bowel movement  Outcome: Progressing Towards Goal  Goal: *Tolerating diet  Outcome: Progressing Towards Goal

## 2022-10-30 NOTE — PROGRESS NOTES
Physical Therapy  Referral received, chart reviewed and noted patient scheduled for ankle surgery today. Will defer PT evaluation until post op. Please provide WB clarification post op.    Jos Shaw, PT, DPT

## 2022-10-30 NOTE — PROGRESS NOTES
Transition of Care Plan:    RUR: 8%  Disposition: TBD-The patient will likely require placement upon d/c. The patient's family is hopeful she can d/c to [facility]. If SNF is recommended they would like to pursue placement at [facility]   Follow up appointments: To be scheduled prior to d/c   DME needed: The patient has cane and a front-wheeled walker   Transportation at Discharge: The patient will likely require stretcher transport upon d/c   Keys or means to access home: Yes       IM Medicare Letter: To be given prior to d/c   Is patient a  and connected with the [place]? N/A               If yes, was [employer] transfer form completed and [facility] notified? Caregiver Contact: Daniel Ortiz, Daughter, Phone: [phone]  Discharge Caregiver contacted prior to discharge? Yes, CM will contact her daughter prior to d/c   Care Conference needed?: No    Reason for Admission:  R Ankle Fracture               RUR Score:  8%                   Plan for utilizing home health: Unknown at this time, PT/OT recs are pending. The patient will likely require SNF vs IPR placement upon d/c       PCP: First and Last name:  Katerina Schulte MD   Name of Practice:    Are you a current patient: Yes/No: Yes   Approximate date of last visit: Unknown    Can you participate in a virtual visit with your PCP: No                    Current Advanced Directive/Advance Care Plan: Full Code    Healthcare Decision Maker: Daniel Ortiz, Daughter, Phone: [phone]                Transition of Care Plan:          CM spoke with the patient's daughter, Daniel Ortiz, and the patient's sister, Teressa Bhakta, via phone to complete assessment. The patient lives in a 1 level home with 3-4 steps to enter with her . The patient has 3 daughters: 1 lives in [place], [place]; 1 lives in [place], [place]; and 1 lives in [facility]. Her sister lives locally in [place], [place] and is very supportive.  The patient is relatively independent in her ADLs but her  does assist her with getting in and out of the bathtub. She uses a cane or a front-wheeled walker when ambulating. She does not drive and her  assists with transportation needs. The patient  uses [facility] for her medications. She has never been to a SNF/IPR facility and she has never used [facility] services in the past.     Care Management Interventions  PCP Verified by CM:  Yes  Mode of Transport at Discharge: BLS  Transition of Care Consult (CM Consult): Discharge Planning  MyChart Signup: No  Discharge Durable Medical Equipment: No  Physical Therapy Consult: Yes  Occupational Therapy Consult: Yes  Speech Therapy Consult: No  Support Systems: Spouse/Significant Other, Child(jyoti), Other Family Member(s)  Confirm Follow Up Transport: Family  The Patient and/or Patient Representative was Provided with a Choice of Provider and Agrees with the Discharge Plan?: Yes  Name of the Patient Representative Who was Provided with a Choice of Provider and Agrees with the Discharge Plan: Rc Magana (Daughter)  Kareem Provided?: No  Discharge Location  Patient Expects to[de-identified]e Discharged [place], [place]

## 2022-10-30 NOTE — ANESTHESIA POSTPROCEDURE EVALUATION
Procedure(s):  RIGHT ANKLE OPEN REDUCTION INTERNAL FIXATION. general    Anesthesia Post Evaluation      Multimodal analgesia: multimodal analgesia used between 6 hours prior to anesthesia start to PACU discharge  Patient location during evaluation: PACU  Patient participation: complete - patient participated  Level of consciousness: awake and alert  Pain management: adequate  Airway patency: patent  Anesthetic complications: no  Cardiovascular status: acceptable, hemodynamically stable and blood pressure returned to baseline  Respiratory status: acceptable and room air  Hydration status: euvolemic  Post anesthesia nausea and vomiting:  none  Final Post Anesthesia Temperature Assessment:  Normothermia (36.0-37.5 degrees C)      INITIAL Post-op Vital signs:   Vitals Value Taken Time   /94 10/30/22 1132   Temp 37.2 °C (99 °F) 10/30/22 1047   Pulse 74 10/30/22 1132   Resp 17 10/30/22 1132   SpO2 97 % 10/30/22 1132   Vitals shown include unvalidated device data.

## 2022-10-30 NOTE — PROGRESS NOTES
End of Shift Note    Bedside shift change report given to Geovanna MEZA (oncoming nurse) by Ailyn Handy RN (offgoing nurse). Report included the following information SBAR, Kardex, Intake/Output, MAR, and Recent Results    Shift worked:  day     Shift summary and any significant changes:     VS stable, surgery today, voiding clear minda urine via purewicc. Concerns for physician to address:       Zone phone for oncoming shift:   [phone]       Activity:  Activity Level: Up with Assistance  Number times ambulated in hallways past shift: 0  Number of times OOB to chair past shift: 0    Cardiac:   Cardiac Monitoring: No      Cardiac Rhythm: Sinus Rhythm    Access:  Current line(s): PIV     Genitourinary:   Urinary status: voiding and external catheter    Respiratory:   O2 Device: None (Room air)  Chronic home O2 use?: NO  Incentive spirometer at bedside: YES       GI:  Last Bowel Movement Date: 10/23/22  Current diet:  ADULT DIET Regular  Passing flatus: YES  Tolerating current diet: YES       Pain Management:   Patient states pain is manageable on current regimen: YES    Skin:  Hitesh Score: 15  Interventions: float heels, increase time out of bed, PT/OT consult, limit briefs, and internal/external urinary devices    Patient Safety:  Fall Score:  Total Score: 3  Interventions: assistive device (walker, cane, etc), gripper socks, pt to call before getting OOB, stay with me (per policy), and gait belt  High Fall Risk: Yes    Length of Stay:  Expected LOS: - - -  Actual LOS: 0      Cori Dominique RN

## 2022-10-30 NOTE — PROGRESS NOTES
End of Shift Note    Bedside shift change report given to Cori MEZA   (oncoming nurse) by Shreyas Watts RN (offgoing nurse). Report included the following information SBAR, Kardex, ED Summary, Procedure Summary, Intake/Output, MAR, and Recent Results    Shift worked:  7p -7a     Shift summary and any significant changes:     Awaiting to talk to MD regarding surgery  CHG bath done on admission  Jeffry April would like to talk to MD       Concerns for physician to address:  Surgery and communicate with daughter       Zone phone for oncoming shift:   [phone]       Activity:  Activity Level: Bed Rest  Number times ambulated in hallways past shift: 0  Number of times OOB to chair past shift: 0    Cardiac:   Cardiac Monitoring: No           Access:  Current line(s): PIV     Genitourinary:   Urinary status: voiding and external catheter    Respiratory:   O2 Device: None (Room air)  Chronic home O2 use?: NO  Incentive spirometer at bedside: NO       GI:  Last Bowel Movement Date: 10/23/22  Current diet:  DIET NPO  Passing flatus: YES  Tolerating current diet: YES       Pain Management:   Patient states pain is manageable on current regimen: YES    Skin:  Hitesh Score: 15  Interventions: float heels, increase time out of bed, internal/external urinary devices, and nutritional support     Patient Safety:  Fall Score:  Total Score: 3  Interventions: gripper socks  High Fall Risk: Yes    Length of Stay:  Expected LOS: - - -  Actual LOS: 0      Shreyas Watts RN

## 2022-10-30 NOTE — BRIEF OP NOTE
Brief Postoperative Note    Patient: Maureen Arora  YOB: 1945  MRN: [id number]    Date of Procedure: 10/30/2022     Pre-Op Diagnosis: right ankle fracture    Post-Op Diagnosis: Same as preoperative diagnosis. Procedure(s):  RIGHT ANKLE OPEN REDUCTION INTERNAL FIXATION    Surgeon(s):  Daniel Perez DO    Surgical Assistant: Surg Asst-1: Tyler Kelly RN    Anesthesia: Other     Estimated Blood Loss (mL): Minimal    Complications: None    Specimens: * No specimens in log *     Implants:   Implant Name Type Inv.  Item Serial No.  Lot No. LRB No. Used Action   SCREW BNE L50MM DIA3.5MM GIOVANNA S STL ST FULL THRD DAVID T15 - SNA  SCREW BNE L50MM DIA3.5MM GIOVANNA S STL ST FULL THRD DAVID T15 NA [employer]_WD NA Right 1 Implanted   SCREW BNE L22MM DIA3.5MM GIOVANNA S STL ST DAVID FULL THRD T15 - SNA  SCREW BNE L22MM DIA3.5MM GIOVANNA S STL ST DAVID FULL THRD T15 NA [employer]_WD NA Right 1 Implanted   SCREW BNE L12MM DIA3.5MM GIOVANNA S STL ST NONCANNULATED FULL - SNA  SCREW BNE L12MM DIA3.5MM GIOVANNA S STL ST NONCANNULATED FULL NA [employer]_WD NA Right 2 Implanted   SCREW BNE L14MM DIA3.5MM GIOVANNA S STL ST FULL THRD DAVID T15 - SNA  SCREW BNE L14MM DIA3.5MM GIOVANNA S STL ST FULL THRD DAVID T15 NA [employer]WD NA Right 1 Implanted   SCREW BNE L16MM DIA4MM CANC S STL ST JOCELIN NONLOCKING FULL - SNA  SCREW BNE L16MM DIA4MM CANC S STL ST JOCELIN NONLOCKING FULL NA [employer]_WD NA Right 2 Implanted   PLATE BNE [id number] 7 H S STL 1/3 TBLR DAVID COMPR W/ CLLR FOR - SNA  PLATE BNE [id number] 7 H S STL 1/3 TBLR DAVID COMPR W/ CLLR FOR NA [employer]WD NA Right 1 Implanted       Drains:   External Urinary Catheter 10/30/22 (Active)       Findings: deann ankle fx    Electronically Signed by Dany Rocha DO on 10/30/2022 at 11:09 AM

## 2022-10-30 NOTE — PROGRESS NOTES
Brief orthopedic note:    78 yr old female with 3week old unstable right ankle fracture after an injury    Plan:  - Splint applied by ED.  - NWB RLE  - Elevate right leg on multiple pillows to help control swelling  - NPO  - Potential ORIF right ankle tomorrow 10/30 by Dr Jonathan Maldonado if clear and swelling permits. Full note to follow in AM    Dr Jonathan Maldonado aware of the above.     Claudetta Rad, PA

## 2022-10-30 NOTE — PERIOP NOTES
TRANSFER - OUT REPORT:    Verbal report given to DERRICK Persaud(name) on [employer]  being transferred to [facility](unit) for routine post - op       Report consisted of patients Situation, Background, Assessment and   Recommendations(SBAR). Information from the following report(s) SBAR, Kardex, ED Summary, OR Summary, Procedure Summary, Intake/Output, MAR, Accordion, Recent Results, Med Rec Status, Cardiac Rhythm NSR, PVC, Alarm Parameters , Pre Procedure Checklist, Procedure Verification, and Quality Measures was reviewed with the receiving nurse. Opportunity for questions and clarification was provided.       Patient transported with:   Registered Nurse

## 2022-10-31 LAB
ANION GAP SERPL CALC-SCNC: 4 MMOL/L (ref 5–15)
BUN SERPL-MCNC: 20 MG/DL (ref 6–20)
BUN/CREAT SERPL: 19 (ref 12–20)
CALCIUM SERPL-MCNC: 10 MG/DL (ref 8.5–10.1)
CHLORIDE SERPL-SCNC: 99 MMOL/L (ref 97–108)
CO2 SERPL-SCNC: 33 MMOL/L (ref 21–32)
CREAT SERPL-MCNC: 1.05 MG/DL (ref 0.55–1.02)
GLUCOSE SERPL-MCNC: 117 MG/DL (ref 65–100)
POTASSIUM SERPL-SCNC: 3.6 MMOL/L (ref 3.5–5.1)
SODIUM SERPL-SCNC: 136 MMOL/L (ref 136–145)

## 2022-10-31 PROCEDURE — 74011250636 HC RX REV CODE- 250/636: Performed by: ORTHOPAEDIC SURGERY

## 2022-10-31 PROCEDURE — 97110 THERAPEUTIC EXERCISES: CPT

## 2022-10-31 PROCEDURE — 74011000258 HC RX REV CODE- 258: Performed by: ORTHOPAEDIC SURGERY

## 2022-10-31 PROCEDURE — 74011250637 HC RX REV CODE- 250/637: Performed by: ORTHOPAEDIC SURGERY

## 2022-10-31 PROCEDURE — 74011250637 HC RX REV CODE- 250/637: Performed by: STUDENT IN AN ORGANIZED HEALTH CARE EDUCATION/TRAINING PROGRAM

## 2022-10-31 PROCEDURE — 97535 SELF CARE MNGMENT TRAINING: CPT

## 2022-10-31 PROCEDURE — 97530 THERAPEUTIC ACTIVITIES: CPT

## 2022-10-31 PROCEDURE — 97162 PT EVAL MOD COMPLEX 30 MIN: CPT

## 2022-10-31 PROCEDURE — 74011000250 HC RX REV CODE- 250: Performed by: STUDENT IN AN ORGANIZED HEALTH CARE EDUCATION/TRAINING PROGRAM

## 2022-10-31 PROCEDURE — 80048 BASIC METABOLIC PNL TOTAL CA: CPT

## 2022-10-31 PROCEDURE — 97166 OT EVAL MOD COMPLEX 45 MIN: CPT

## 2022-10-31 PROCEDURE — 65270000029 HC RM PRIVATE

## 2022-10-31 PROCEDURE — 36415 COLL VENOUS BLD VENIPUNCTURE: CPT

## 2022-10-31 PROCEDURE — 94760 N-INVAS EAR/PLS OXIMETRY 1: CPT

## 2022-10-31 RX ADMIN — NADOLOL 40 MG: 40 TABLET ORAL at 08:20

## 2022-10-31 RX ADMIN — OXYCODONE 5 MG: 5 TABLET ORAL at 05:20

## 2022-10-31 RX ADMIN — Medication 1 AMPULE: at 08:22

## 2022-10-31 RX ADMIN — ENOXAPARIN SODIUM 30 MG: 100 INJECTION SUBCUTANEOUS at 08:20

## 2022-10-31 RX ADMIN — OXYCODONE 5 MG: 5 TABLET ORAL at 11:15

## 2022-10-31 RX ADMIN — SENNOSIDES AND DOCUSATE SODIUM 2 TABLET: 50; 8.6 TABLET ORAL at 19:03

## 2022-10-31 RX ADMIN — TRIAMTERENE AND HYDROCHLOROTHIAZIDE 1 TABLET: 37.5; 25 TABLET ORAL at 08:20

## 2022-10-31 RX ADMIN — OXYCODONE 5 MG: 5 TABLET ORAL at 16:01

## 2022-10-31 RX ADMIN — ACETAMINOPHEN 650 MG: 325 TABLET ORAL at 19:03

## 2022-10-31 RX ADMIN — ENOXAPARIN SODIUM 30 MG: 100 INJECTION SUBCUTANEOUS at 22:34

## 2022-10-31 RX ADMIN — ACETAMINOPHEN 650 MG: 325 TABLET ORAL at 10:40

## 2022-10-31 RX ADMIN — SODIUM CHLORIDE, PRESERVATIVE FREE 10 ML: 5 INJECTION INTRAVENOUS at 22:00

## 2022-10-31 RX ADMIN — CEFAZOLIN 3 G: 10 INJECTION, POWDER, FOR SOLUTION INTRAVENOUS at 02:00

## 2022-10-31 RX ADMIN — SODIUM CHLORIDE, PRESERVATIVE FREE 10 ML: 5 INJECTION INTRAVENOUS at 16:02

## 2022-10-31 RX ADMIN — SENNOSIDES AND DOCUSATE SODIUM 2 TABLET: 50; 8.6 TABLET ORAL at 08:19

## 2022-10-31 RX ADMIN — ACETAMINOPHEN 650 MG: 325 TABLET ORAL at 23:38

## 2022-10-31 RX ADMIN — Medication 1 AMPULE: at 22:37

## 2022-10-31 RX ADMIN — CEFAZOLIN 3 G: 10 INJECTION, POWDER, FOR SOLUTION INTRAVENOUS at 10:40

## 2022-10-31 NOTE — PROGRESS NOTES
Hospitalist Progress Note    NAME: Brandon Pierre   :  1945   MRN:  [id number]       Assessment / Plan:  Right ankle fracture  Suspected left ankle sprain of ATFL  -Appreciate Ortho input   -S/p ORIF   -PT/OT consults in AM   -Case Management to assist with DC planning, will likely need SNF vs IPR   -Continue bowel regimen   -PRN pain management with oxycodone and morphine   -Encourage IS    Hypokalemia  -resolved   -Replete with PO   -Trend BMP     Obesity class III by BMI 47.7 with suspected obesity hypoventilation syndrome  History asthma  RENEA not on CPAP  -CTA ruled out PE  -Incidentally showed Chest wall structures show a 1.2 x 1.4 cm left axillary lymph node  Recommend OP follow up   -Noted chronic bicarb elevation likely compensatory for chronic CO2 retention in setting of super morbid obesity and pickwickian syndrome  Patient declined inpatient CPAP-reports she has had 3 studies but is not prescribed a CPAP at present as she was instructed to come back for repeat testing-likely for bilevel evaluation  -PRN nebs  -Monitor respiratory status    Essential hypertension  -Continue PTA: Nadolol, Dyazide for hypertension  -Monitor BP, slightly elevated this AM likely rlt to pain     40 or above Morbid obesity / Body mass index is 47.72 kg/m². Estimated discharge date:   Barriers: SNF vs IPR     Code status: Full  Prophylaxis: Lovenox  Recommended Disposition:  SNF vs IPR      Subjective:     Chief Complaint / Reason for Physician Visit  Patient seen at bedside post surgery. stable to be d/c , need placement   Review of Systems:  Symptom Y/N Comments  Symptom Y/N Comments   Fever/Chills    Chest Pain     Poor Appetite    Edema     Cough    Abdominal Pain     Sputum    Joint Pain y    SOB/LOPEZ    Pruritis/Rash     Nausea/vomit    Tolerating PT/OT     Diarrhea    Tolerating Diet y    Constipation    Other       Could NOT obtain due to:      Objective:     VITALS:   Last 24hrs VS reviewed since prior progress note. Most recent are:  Patient Vitals for the past 24 hrs:   Temp Pulse Resp BP SpO2   10/31/22 1549 97.9 °F (36.6 °C) 72 18 (!) 140/81 96 %   10/31/22 1126 98.6 °F (37 °C) 75 20 (!) 147/92 98 %   10/31/22 0739 98.5 °F (36.9 °C) 70 18 (!) 140/77 97 %   10/31/22 0311 98.7 °F (37.1 °C) 65 18 (!) 140/64 96 %   10/30/22 2328 98.4 °F (36.9 °C) 71 18 125/67 98 %   10/30/22 1959 98.6 °F (37 °C) 72 18 122/67 97 %         Intake/Output Summary (Last 24 hours) at 10/31/2022 1611  Last data filed at 10/31/2022 1127  Gross per 24 hour   Intake --   Output 1100 ml   Net -1100 ml          I had a face to face encounter and independently examined this patient on 10/31/2022, as outlined below:  PHYSICAL EXAM:  General: WD, WN. Alert, cooperative, no acute distress    EENT:  EOMI. Anicteric sclerae. MMM  Resp:  CTA bilaterally, no wheezing or rales. No accessory muscle use  CV:  Regular  rhythm,  No edema  GI:  Soft, obese, Non tender. +Bowel sounds  Neurologic:  Alert and oriented X 3, normal speech,   Psych:   Good insight. Not anxious nor agitated  Skin:  No rashes. No jaundice    Reviewed most current lab test results and cultures  YES  Reviewed most current radiology test results   YES  Review and summation of old records today    NO  Reviewed patient's current orders and MAR    YES  PMH/ reviewed - no change compared to H&P  ________________________________________________________________________  Care Plan discussed with:    Comments   Patient x    Family      RN     Care Manager x    Consultant                        Multidiciplinary team rounds were held today with , nursing, pharmacist and clinical coordinator. Patient's plan of care was discussed; medications were reviewed and discharge planning was addressed.      ________________________________________________________________________  Total NON critical care TIME:  35   Minutes    Total CRITICAL CARE TIME Spent:   Minutes non procedure based      Comments   >50% of visit spent in counseling and coordination of care x    ________________________________________________________________________  Gloria Gimenez MD     Procedures: see electronic medical records for all procedures/Xrays and details which were not copied into this note but were reviewed prior to creation of Plan. LABS:  I reviewed today's most current labs and imaging studies. Pertinent labs include:  Recent Labs     10/30/22  0312 10/29/22  2043   WBC 8.1 8.5   HGB 11.1* 11.3*   HCT 35.6 35.7    210       Recent Labs     10/31/22  0200 10/30/22  0312 10/30/22  0028 10/29/22  2043    140  --  140   K 3.6 3.4*  --  3.1*   CL 99 98  --  98   CO2 33* 35*  --  34*   * 98  --  117*   BUN 20 14  --  13   CREA 1.05* 0.81  --  0.80   CA 10.0 10.2*  --  10.0   MG  --  1.8  --   --    ALB  --   --   --  2.7*   TBILI  --   --   --  0.9   ALT  --   --   --  29   INR  --   --  1.1  --          Signed:  Gloria Gimenez MD

## 2022-10-31 NOTE — PROGRESS NOTES
Po ankle orif  C/o pain in both ankles. Was told by someone she has a sprain left ankle and do not put weight on it  Hgb stable    Patient Vitals for the past 24 hrs:   Temp Pulse Resp BP SpO2   10/31/22 1126 98.6 °F (37 °C) 75 20 (!) 147/92 98 %   10/31/22 0739 98.5 °F (36.9 °C) 70 18 (!) 140/77 97 %   10/31/22 0311 98.7 °F (37.1 °C) 65 18 (!) 140/64 96 %   10/30/22 2328 98.4 °F (36.9 °C) 71 18 125/67 98 %   10/30/22 1959 98.6 °F (37 °C) 72 18 122/67 97 %   10/30/22 1600 97.3 °F (36.3 °C) 70 18 136/84 98 %   10/30/22 1500 98.2 °F (36.8 °C) 75 18 125/68 98 %   10/30/22 1400 98 °F (36.7 °C) 68 18 (!) 142/88 95 %   10/30/22 1300 97.9 °F (36.6 °C) 77 18 (!) 170/93 96 %   10/30/22 1202 97.9 °F (36.6 °C) 76 18 (!) 181/99 100 %     Dressing removed. Wound clean and dry  Musculoskeletal: Josie's sign negative in bilateral lower extremities. Calves soft, supple, non-tender upon palpation or with passive stretch. Left ankle dependent edema. Ttp lateral  No deformity or wounds     Ankle xrays no fracture    Bilateral cam boots placed  Will need to weight bear left foot for transfers.

## 2022-10-31 NOTE — PROGRESS NOTES
Problem: Falls - Risk of  Goal: *Absence of Falls  Description: Document Mila Munroe Fall Risk and appropriate interventions in the flowsheet. Outcome: Progressing Towards Goal  Note: Fall Risk Interventions:  Mobility Interventions: Assess mobility with egress test, Communicate number of staff needed for ambulation/transfer, OT consult for ADLs, Patient to call before getting OOB, PT Consult for mobility concerns, PT Consult for assist device competence, Strengthening exercises (ROM-active/passive), Utilize walker, cane, or other assistive device, Utilize gait belt for transfers/ambulation         Medication Interventions: Assess postural VS orthostatic hypotension, Bed/chair exit alarm, Evaluate medications/consider consulting pharmacy, Patient to call before getting OOB, Utilize gait belt for transfers/ambulation, Teach patient to arise slowly    Elimination Interventions: Bed/chair exit alarm, Call light in reach, Patient to call for help with toileting needs, Elevated toilet seat, Stay With Me (per policy)    History of Falls Interventions: Bed/chair exit alarm, Door open when patient unattended, Consult care management for discharge planning, Investigate reason for fall         Problem: Patient Education: Go to Patient Education Activity  Goal: Patient/Family Education  Outcome: Progressing Towards Goal     Problem: Pressure Injury - Risk of  Goal: *Prevention of pressure injury  Description: Document Hitesh Scale and appropriate interventions in the flowsheet.   Outcome: Progressing Towards Goal  Note: Pressure Injury Interventions:  Sensory Interventions: Assess changes in LOC, Check visual cues for pain, Discuss PT/OT consult with provider, Float heels, Keep linens dry and wrinkle-free, Maintain/enhance activity level, Minimize linen layers    Moisture Interventions: Absorbent underpads, Internal/External urinary devices, Maintain skin hydration (lotion/cream)    Activity Interventions: Assess need for specialty bed, Chair cushion, Increase time out of bed, Pressure redistribution bed/mattress(bed type)    Mobility Interventions: Float heels, HOB 30 degrees or less, Pressure redistribution bed/mattress (bed type), PT/OT evaluation    Nutrition Interventions: Document food/fluid/supplement intake                     Problem: Patient Education: Go to Patient Education Activity  Goal: Patient/Family Education  Outcome: Progressing Towards Goal     Problem: Patient Education: Go to Patient Education Activity  Goal: Patient/Family Education  Outcome: Progressing Towards Goal     Problem: Lower Extremity Fracture:Day of Surgery (Intiate SCIP Measures for Post-op Care)  Goal: Off Pathway (Use only if patient is Off Pathway)  Outcome: Progressing Towards Goal  Goal: Activity/Safety  Outcome: Progressing Towards Goal  Goal: Consults, if ordered  Outcome: Progressing Towards Goal  Goal: Diagnostic Test/Procedures  Outcome: Progressing Towards Goal  Goal: Nutrition/Diet  Outcome: Progressing Towards Goal  Goal: Medications  Outcome: Progressing Towards Goal  Goal: Respiratory  Outcome: Progressing Towards Goal  Goal: Treatments/Interventions/Procedures  Outcome: Progressing Towards Goal  Goal: Psychosocial  Outcome: Progressing Towards Goal  Goal: *Optimal pain control at patient's stated goal  Outcome: Progressing Towards Goal  Goal: *Hemodynamically stable  Outcome: Progressing Towards Goal  Goal: *Adequate oxygenation  Outcome: Progressing Towards Goal

## 2022-10-31 NOTE — PROGRESS NOTES
TRANSFER - OUT REPORT:    Verbal report given to [facility] RN(name) on Emma Watson  being transferred to Medical Telemetry(unit) for routine progression of care       Report consisted of patients Situation, Background, Assessment and   Recommendations(SBAR). Information from the following report(s) SBAR, Kardex, Intake/Output, and MAR was reviewed with the receiving nurse. Lines:   Peripheral IV 10/30/22 Anterior; Left Forearm (Active)   Site Assessment Clean, dry, & intact 10/31/22 0744   Phlebitis Assessment 0 10/31/22 0744   Infiltration Assessment 0 10/31/22 0744   Dressing Status Clean, dry, & intact 10/31/22 0744   Dressing Type Tape;Transparent 10/31/22 0744   Hub Color/Line Status Pink;Capped 10/31/22 0744   Action Taken Open ports on tubing capped 10/31/22 0744   Alcohol Cap Used Yes 10/31/22 0744        Opportunity for questions and clarification was provided.       Patient transported with:   [employer]

## 2022-10-31 NOTE — PROGRESS NOTES
End of Shift Note    Bedside shift change report given to Angelica (oncoming nurse) by Lenin Qureshi (offgoing nurse). Report included the following information SBAR, Kardex, Intake/Output, MAR, and Recent Results    Shift worked:  [id number]     Shift summary and any significant changes:     Patient resting in bed, pain meds given x1, Ice pack to right ankle, elevated on blankets. Concerns for physician to address:        Zone phone for oncoming shift:   [phone]       Activity:  Activity Level: Up with Assistance  Number times ambulated in hallways past shift: 0  Number of times OOB to chair past shift: 0    Cardiac:   Cardiac Monitoring: No      Cardiac Rhythm: Sinus Rhythm    Access:  Current line(s): PIV     Genitourinary:   Urinary status: voiding and external catheter    Respiratory:   O2 Device: None (Room air)  Chronic home O2 use?: NO  Incentive spirometer at bedside: YES       GI:  Last Bowel Movement Date: 10/23/22  Current diet:  ADULT DIET Regular  Passing flatus: YES  Tolerating current diet: YES       Pain Management:   Patient states pain is manageable on current regimen: YES    Skin:  Hitesh Score: 15  Interventions: float heels, increase time out of bed, foam dressing, and PT/OT consult    Patient Safety:  Fall Score:  Total Score: 3  Interventions: bed/chair alarm, assistive device (walker, cane, etc), gripper socks, pt to call before getting OOB, and stay with me (per policy)  High Fall Risk: Yes    Length of Stay:  Expected LOS: - - -  Actual LOS: 1      Idalia M [facility]

## 2022-10-31 NOTE — PROGRESS NOTES
Problem: Falls - Risk of  Goal: *Absence of Falls  Description: Document Jose Guadalupe Rc Fall Risk and appropriate interventions in the flowsheet. Outcome: Progressing Towards Goal  Note: Fall Risk Interventions:  Mobility Interventions: Communicate number of staff needed for ambulation/transfer, Patient to call before getting OOB         Medication Interventions: Patient to call before getting OOB    Elimination Interventions: Call light in reach, Patient to call for help with toileting needs    History of Falls Interventions: Consult care management for discharge planning         Problem: Patient Education: Go to Patient Education Activity  Goal: Patient/Family Education  Outcome: Progressing Towards Goal     Problem: Pressure Injury - Risk of  Goal: *Prevention of pressure injury  Description: Document Hitesh Scale and appropriate interventions in the flowsheet.   Outcome: Progressing Towards Goal  Note: Pressure Injury Interventions:  Sensory Interventions: Assess changes in LOC, Float heels, Keep linens dry and wrinkle-free, Minimize linen layers    Moisture Interventions: Absorbent underpads, Internal/External urinary devices, Minimize layers    Activity Interventions: Assess need for specialty bed, PT/OT evaluation, Increase time out of bed    Mobility Interventions: Float heels, HOB 30 degrees or less, PT/OT evaluation, Pressure redistribution bed/mattress (bed type)    Nutrition Interventions: Document food/fluid/supplement intake                     Problem: Patient Education: Go to Patient Education Activity  Goal: Patient/Family Education  Outcome: Progressing Towards Goal     Problem: Patient Education: Go to Patient Education Activity  Goal: Patient/Family Education  Outcome: Progressing Towards Goal     Problem: Lower Extremity Fracture:Day of Surgery (Intiate SCIP Measures for Post-op Care)  Goal: Off Pathway (Use only if patient is Off Pathway)  Outcome: Progressing Towards Goal  Goal: Activity/Safety  Outcome: Progressing Towards Goal  Goal: Consults, if ordered  Outcome: Progressing Towards Goal  Goal: Diagnostic Test/Procedures  Outcome: Progressing Towards Goal  Goal: Nutrition/Diet  Outcome: Progressing Towards Goal  Goal: Medications  Outcome: Progressing Towards Goal  Goal: Respiratory  Outcome: Progressing Towards Goal  Goal: Treatments/Interventions/Procedures  Outcome: Progressing Towards Goal  Goal: Psychosocial  Outcome: Progressing Towards Goal  Goal: *Optimal pain control at patient's stated goal  Outcome: Progressing Towards Goal  Goal: *Hemodynamically stable  Outcome: Progressing Towards Goal  Goal: *Adequate oxygenation  Outcome: Progressing Towards Goal     Problem: Lower Extremity Fracture:Post-op Day 1  Goal: Off Pathway (Use only if patient is Off Pathway)  Outcome: Progressing Towards Goal  Goal: Activity/Safety  Outcome: Progressing Towards Goal  Goal: Consults, if ordered  Outcome: Progressing Towards Goal  Goal: Diagnostic Test/Procedures  Outcome: Progressing Towards Goal  Goal: Nutrition/Diet  Outcome: Progressing Towards Goal  Goal: Discharge Planning  Outcome: Progressing Towards Goal  Goal: Medications  Outcome: Progressing Towards Goal  Goal: Respiratory  Outcome: Progressing Towards Goal  Goal: Treatments/Interventions/Procedures  Outcome: Progressing Towards Goal  Goal: Psychosocial  Outcome: Progressing Towards Goal  Goal: *Optimal pain control at patient's stated goal  Outcome: Progressing Towards Goal  Goal: *Hemodynamically stable  Outcome: Progressing Towards Goal  Goal: *Adequate oxygenation  Outcome: Progressing Towards Goal  Goal: *PT/INR within defined limits  Outcome: Progressing Towards Goal  Goal: *Demonstrates progressive activity  Outcome: Progressing Towards Goal     Problem: Lower Extremity Fracture:Post-op Day 2  Goal: Off Pathway (Use only if patient is Off Pathway)  Outcome: Progressing Towards Goal  Goal: Activity/Safety  Outcome: Progressing Towards Goal  Goal: Diagnostic Test/Procedures  Outcome: Progressing Towards Goal  Goal: Nutrition/Diet  Outcome: Progressing Towards Goal  Goal: Discharge Planning  Outcome: Progressing Towards Goal  Goal: Medications  Outcome: Progressing Towards Goal  Goal: Respiratory  Outcome: Progressing Towards Goal  Goal: Treatments/Interventions/Procedures  Outcome: Progressing Towards Goal  Goal: Psychosocial  Outcome: Progressing Towards Goal  Goal: *Optimal pain control at patient's stated goal  Outcome: Progressing Towards Goal  Goal: *Hemodynamically stable  Outcome: Progressing Towards Goal  Goal: *Adequate oxygenation  Outcome: Progressing Towards Goal  Goal: *PT/INR within defined limits  Outcome: Progressing Towards Goal  Goal: *Demonstrates progressive activity  Outcome: Progressing Towards Goal  Goal: *Voiding  Outcome: Progressing Towards Goal  Goal: *Bowel movement  Outcome: Progressing Towards Goal  Goal: *Tolerating diet  Outcome: Progressing Towards Goal     Problem: Lower Extremity Fracture:Post-op Day 3  Goal: Off Pathway (Use only if patient is Off Pathway)  Outcome: Progressing Towards Goal  Goal: Activity/Safety  Outcome: Progressing Towards Goal  Goal: Diagnostic Test/Procedures  Outcome: Progressing Towards Goal  Goal: Nutrition/Diet  Outcome: Progressing Towards Goal  Goal: Discharge Planning  Outcome: Progressing Towards Goal  Goal: Medications  Outcome: Progressing Towards Goal  Goal: Respiratory  Outcome: Progressing Towards Goal  Goal: Treatments/Interventions/Procedures  Outcome: Progressing Towards Goal  Goal: Psychosocial  Outcome: Progressing Towards Goal  Goal: *Optimal pain control at patient's stated goal  Outcome: Progressing Towards Goal  Goal: *Hemodynamically stable  Outcome: Progressing Towards Goal  Goal: *Adequate oxygenation  Outcome: Progressing Towards Goal  Goal: *PT/INR within defined limits  Outcome: Progressing Towards Goal  Goal: *Demonstrates progressive activity  Outcome: Progressing Towards Goal  Goal: *Voiding  Outcome: Progressing Towards Goal  Goal: *Bowel movement  Outcome: Progressing Towards Goal  Goal: *Tolerating diet  Outcome: Progressing Towards Goal

## 2022-10-31 NOTE — PROGRESS NOTES
Problem: Mobility Impaired (Adult and Pediatric)  Goal: *Acute Goals and Plan of Care (Insert Text)  Description: FUNCTIONAL STATUS PRIOR TO ADMISSION: Patient was independent and without use of DME her her home. Patient was modified independent for basic and instrumental ADLs. For the last week, has been mostly bed bound following ankle fracture a week ago. HOME SUPPORT PRIOR TO ADMISSION: The patient lived with spouse and required moderate assistance for lower body dressing. Physical Therapy Goals  Initiated 10/31/2022  1. Patient will move from supine to sit and sit to supine , scoot up and down, and roll side to side in bed with minimal assistance/contact guard assist within 7 day(s). 2.  Patient will transfer from bed to chair and chair to bed with minimal assistance/contact guard assist using the least restrictive device within 7 day(s). 3.  Patient will perform sit to stand with minimal assistance assist within 7 day(s). 4.  Patient will be supervision with the performance of an exercise program within 7 days. Outcome: Not Met   PHYSICAL THERAPY EVALUATION  Patient: Timoteo Ribera (89 y.o. female)  Date: 10/31/2022  Primary Diagnosis: Closed right ankle fracture [S82.891A]  Procedure(s) (LRB):  RIGHT ANKLE OPEN REDUCTION INTERNAL FIXATION (Right) 1 Day Post-Op   Precautions:  Fall, NWB (RLE NWB; L ankle WBAT due to sprain; has B cam boots)    ASSESSMENT  Based on the objective data described below, the patient presents with generalized weakness, restricted WB status on BLE with NWB on RLE and WBAT on LLE, increased pain in both ankles with light pressure during repositioning, and decreased mobility skills far below independent baseline noted above. Lying in bed when PT arrived with OT. Provided aarom to bilateral knees and hips. Instructed to do toe wiggles on R foot and ankle pumps on L foot as tolerated.  After applying cam boots, PA did RLE and PT did LLE, assisted patient to sitting with mod/max a x 2 with OT assistance. Sitting balance was intact. Deferred attempted standing due to current L ankle pain from sprain, but will attempt in tomorrows session. Left patient up on side of bed for lunch with all needs met when the session ended. Current Level of Function Impacting Discharge (mobility/balance): mod/max a x 2 supine to sit. Functional Outcome Measure: The patient scored 35 on the Barthel outcome measure which is indicative of being very dependent with ADL and mobility skills at this time. Other factors to consider for discharge: high risk for falls and skin breakdown; patient is NWB on RLE and weighs 269 pounds - currently unable to support 100% of body weight on UE to maintain NWB. Patient will benefit from skilled therapy intervention to address the above noted impairments. PLAN :  Recommendations and Planned Interventions: bed mobility training, transfer training, therapeutic exercises, modalities, edema management/control, patient and family training/education, and therapeutic activities      Frequency/Duration: Patient will be followed by physical therapy:  daily to address goals. Recommendation for discharge: (in order for the patient to meet his/her long term goals)  Therapy up to 5 days/week in SNF setting    This discharge recommendation:  Has not yet been discussed the attending provider and/or case management    IF patient discharges home will need the following DME: wheelchair       SUBJECTIVE:   Patient stated  My L ankle is sprained and painful as well.      OBJECTIVE DATA SUMMARY:   HISTORY:    Past Medical History:   Diagnosis Date    Arthritis     bursitis    Asthma     Chronic pain     feet,hips    Family history of colon cancer 02/12/2015    mother    GERD (gastroesophageal reflux disease)     H/O bone density study 07/30/2012    nml    Hyperlipidemia 05/08/2012    Hypertension     Sleep apnea     no cpap     Past Surgical History:   Procedure Laterality Date    COLONOSCOPY  2006    nml    EGD  2006    HX CHOLECYSTECTOMY  08/18/2016    Lap alber    HX DILATION AND CURETTAGE      HX PEPPER AND BSO      HX TONSILLECTOMY      HX TUBAL LIGATION      KRISHNA MAMMOGRAM SCREENING BILATERAL  7/30/12       Personal factors and/or comorbidities impacting plan of care: morbid obesity, asthma, chronic hip and feet pain    Home Situation  Home Environment: Private residence  # Steps to Enter: 3  Rails to Enter: Yes  Hand Rails : Bilateral (wide)  One/Two Story Residence: One story  Living Alone: No  Support Systems: Spouse/Significant Other, Child(jyoti)  Patient Expects to be Discharged to[de-identified] Skilled nursing facility  Current DME Used/Available at Home: Grab bars, Walker, rollator, Commode, bedside  Tub or Shower Type: Tub/Shower combination (mainly sponge bathing)    EXAMINATION/PRESENTATION/DECISION MAKING:   Critical Behavior:  Neurologic State: Alert  Orientation Level: Oriented X4  Cognition: Appropriate decision making, Appropriate for age attention/concentration, Appropriate safety awareness, Follows commands  Safety/Judgement: Awareness of environment, Fall prevention, Decreased insight into deficits, Good awareness of safety precautions  Hearing: Auditory  Auditory Impairment: None  Skin:  RLE  dressing/ace wrap is cdi  Edema: none L ankle and moderate R ankle. Range Of Motion:  AROM: Generally decreased, functional           PROM: Within functional limits (B ankles immobilized in cam boots)           Strength:    Strength: Generally decreased, functional                    Tone & Sensation:   Tone: Normal              Sensation: Intact               Coordination:  Coordination: Within functional limits      Functional Mobility:  Bed Mobility:     Supine to Sit: Moderate assistance;Assist x2; Additional time;Maximum assistance     Scooting:  Moderate assistance;Maximum assistance  Transfers:  Sit to Stand:  (deferred due to patients NWB on RLE and increased L ankle pain at this time.)                          Balance:   Sitting: Intact  Standing:  (not tested)  Ambulation/Gait Training:                    Right Side Weight Bearing: Non-weight bearing  Left Side Weight Bearing: As tolerated          Therapeutic Exercises:   L ankle pumps, knee/hip flexion, hip abd. R toe wiggles, knee/hip flexion, hip abd.  5 reps each. Functional Measure:  Barthel Index:    Bathin  Bladder: 5  Bowels: 10  Groomin  Dressin  Feeding: 10  Mobility: 0  Stairs: 0  Toilet Use: 0  Transfer (Bed to Chair and Back): 0  Total: 35/100       The Barthel ADL Index: Guidelines  1. The index should be used as a record of what a patient does, not as a record of what a patient could do. 2. The main aim is to establish degree of independence from any help, physical or verbal, however minor and for whatever reason. 3. The need for supervision renders the patient not independent. 4. A patient's performance should be established using the best available evidence. Asking the patient, friends/relatives and nurses are the usual sources, but direct observation and common sense are also important. However direct testing is not needed. 5. Usually the patient's performance over the preceding 24-48 hours is important, but occasionally longer periods will be relevant. 6. Middle categories imply that the patient supplies over 50 per cent of the effort. 7. Use of aids to be independent is allowed. Score Interpretation (from [address] [address])    Independent   60-79 Minimally independent   40-59 Partially dependent   20-39 Very dependent   <20 Totally dependent     -Rena Lieberman., Barthel, D.W. (1965). Functional evaluation: the Barthel Index. [address] [facility] ([address] (1997). The Barthel activities of daily living index: self-reporting versus actual performance in the old (> or = 75 years).  Journal of [address] [place] [address] 45(7), [facility], LUDA, Desiree INGRID [place] (1999). Measuring the change in disability after inpatient rehabilitation; comparison of the responsiveness of the Barthel Index and Functional [place] Measure. Journal of Neurology, Neurosurgery, and Psychiatry, 664), 130-272. ZEINA Boyce, OBI Fang, & Fransico Brunson M.A. (2004) Assessment of post-stroke quality of life in cost-effectiveness studies: The usefulness of the Barthel Index and the EuroQoL-5D. Quality of Life Research, 15, 497-64        Physical Therapy Evaluation Charge Determination   History Examination Presentation Decision-Making   HIGH Complexity :3+ comorbidities / personal factors will impact the outcome/ POC  HIGH Complexity : 4+ Standardized tests and measures addressing body structure, function, activity limitation and / or participation in recreation  HIGH Complexity : Unstable and unpredictable characteristics  Other outcome measures Barthel  HIGH       Based on the above components, the patient evaluation is determined to be of the following complexity level: HIGH     Pain Ratin/10 L ankle to light pressure - PA assessed. Activity Tolerance:   Fair, SpO2 stable on RA, and requires frequent rest breaks    After treatment patient left in no apparent distress:   Call bell within reach and sitting on side of bed    COMMUNICATION/EDUCATION:   The patients plan of care was discussed with: Occupational therapist, Registered nurse, Physician, and PA . Fall prevention education was provided and the patient/caregiver indicated understanding., Patient/family have participated as able in goal setting and plan of care. , and Patient/family agree to work toward stated goals and plan of care.     Thank you for this referral.  Real Wyatt, PT   Time Calculation: 28 mins

## 2022-10-31 NOTE — PROGRESS NOTES
Problem: Self Care Deficits Care Plan (Adult)  Goal: *Acute Goals and Plan of Care (Insert Text)  Description: FUNCTIONAL STATUS PRIOR TO ADMISSION: Patient reported having rollator but stated she did not utilize for functional mobility. Patient required assist for LB ADLs but was able to complete toileting, UB bathing/dressing, and light household tasks without assist.     HOME SUPPORT: The patient lived with . Occupational Therapy Goals  Initiated 10/31/2022  1. Patient will perform grooming with supervision/set-up within 7 day(s). 2.  Patient will perform upper body dressing with supervision/set-up within 7 day(s). 3.  Patient will perform lower body dressing using AE PRN with minimal assistance within 7 day(s). 4.  Patient will perform toilet transfers to [facility] while maintaining RLE NWB with moderate assistance within 7 day(s). 5.  Patient will perform all aspects of toileting with moderate assistance within 7 day(s). Outcome: Not Met   OCCUPATIONAL THERAPY EVALUATION  Patient: Jud Diamond (89 y.o. female)  Date: 10/31/2022  Primary Diagnosis: Closed right ankle fracture [S82.891A]  Procedure(s) (LRB):  RIGHT ANKLE OPEN REDUCTION INTERNAL FIXATION (Right) 1 Day Post-Op   Precautions: Fall, NWB (RLE NWB; L ankle WBAT due to sprain; has B cam boots)    ASSESSMENT  Based on the objective data described below, the patient presents with decreased independence in self-care and functional mobility secondary to general weakness, impaired balance, NWB RLE, carolyn cam boots, acrolyn ankle pain, and decreased activity tolerance. Patient s/p R ankle ORIF POD 1 with increased c/o pain. Patient is functioning significantly below her baseline for ADLs and functional mobility, now completing ADLs with set-up to total assist and bed mobility with mod/max to mod/max x2. Patient received semisupine in bed and cleared for therapy by nursing.  Patient required total assist to don carolyn cam boots while supine in bed and reported increased pain with activity (PA donned RLE, PT donned LLE). Patient completed supine > sit with mod/max assist x2 and demonstrated intact sitting balance. She completed seated grooming tasks with set-up/stand-by assist and UB dressing with min assist, tolerating well. Patient was left sitting EOB with all needs met, call bell in reach, and RN notified. Patient would continue to benefit from skilled OT services during acute hospital stay. Recommend patient discharge to SNF rehab, pending progress. Current Level of Function Impacting Discharge (ADLs/self-care): set-up to total assist for ADLs, mod/max x2 for bed mobility     Functional Outcome Measure: The patient scored 35/100 on the Barthel Index outcome measure which is indicative of being very dependent in ADLs. Other factors to consider for discharge: fall risk, carolyn cam boots, NWB RLE     Patient will benefit from skilled therapy intervention to address the above noted impairments. PLAN :  Recommendations and Planned Interventions: self care training, functional mobility training, therapeutic exercise, balance training, therapeutic activities, endurance activities, patient education, home safety training, and family training/education    Frequency/Duration: Patient will be followed by occupational therapy 4 times a week to address goals. Recommendation for discharge: (in order for the patient to meet his/her long term goals)  Therapy up to 5 days/week in SNF setting    This discharge recommendation:  Has been made in collaboration with the attending provider and/or case management    IF patient discharges home will need the following DME: TBD in SNF rehab       SUBJECTIVE:   Patient stated It feels good to sit up.     OBJECTIVE DATA SUMMARY:   HISTORY:   Past Medical History:   Diagnosis Date    Arthritis     bursitis    Asthma     Chronic pain     feet,hips    Family history of colon cancer 02/12/2015    mother    GERD (gastroesophageal reflux disease)     H/O bone density study 07/30/2012    nml    Hyperlipidemia 05/08/2012    Hypertension     Sleep apnea     no cpap     Past Surgical History:   Procedure Laterality Date    COLONOSCOPY  2006    nml    EGD  2006    HX CHOLECYSTECTOMY  08/18/2016    Lap alber    HX DILATION AND CURETTAGE      HX PEPPER AND BSO      HX TONSILLECTOMY      HX TUBAL LIGATION      KRISHNA MAMMOGRAM SCREENING BILATERAL  7/30/12       Expanded or extensive additional review of patient history:     Home Situation  Home Environment: Private residence  # Steps to Enter: 3  Rails to Enter: Yes  Hand Rails : Bilateral (wide)  One/Two Story Residence: One story  Living Alone: No  Support Systems: Spouse/Significant Other, Child(jyoti)  Patient Expects to be Discharged to[de-identified] Skilled nursing facility  Current DME Used/Available at Home: Grab bars, Walker, rollator, Commode, bedside  Tub or Shower Type: Tub/Shower combination (mainly sponge bathing)    Hand dominance: Right    EXAMINATION OF PERFORMANCE DEFICITS:  Cognitive/Behavioral Status:  Neurologic State: Alert  Orientation Level: Oriented X4  Cognition: Appropriate decision making; Appropriate for age attention/concentration; Appropriate safety awareness; Follows commands  Perception: Appears intact  Perseveration: No perseveration noted  Safety/Judgement: Awareness of environment; Fall prevention;Decreased insight into deficits;Good awareness of safety precautions    Hearing: Auditory  Auditory Impairment: None    Vision/Perceptual:    Acuity: Within Defined Limits    Corrective Lenses: Glasses    Range of Motion:  AROM: Generally decreased, functional  PROM: Within functional limits (B ankles immobilized in cam boots)    Strength:  Strength: Generally decreased, functional    Coordination:  Coordination: Within functional limits  Fine Motor Skills-Upper: Left Intact; Right Intact    Gross Motor Skills-Upper: Left Intact; Right Intact    Tone & Sensation:  Tone: Normal  Sensation: Intact    Balance:  Sitting: Intact  Standing:  (not tested)    Functional Mobility and Transfers for ADLs:  Bed Mobility:  Supine to Sit: Moderate assistance;Assist x2; Additional time;Maximum assistance  Scooting: Moderate assistance;Maximum assistance    Transfers:  Sit to Stand:  (deferred due to patients NWB on RLE and increased L ankle pain at this time.)    ADL Assessment:  Feeding: Setup  Oral Facial Hygiene/Grooming: Setup;Stand-by assistance (seated)  Bathing: Maximum assistance  Upper Body Dressing: Minimum assistance  Lower Body Dressing: Total assistance  Toileting: Total assistance    ADL Intervention and task modifications:    Grooming  Position Performed: Seated edge of bed  Washing Face: Set-up; Stand-by assistance  Washing Hands: Set-up; Stand-by assistance  Cues: Verbal cues provided    Upper Body Dressing Assistance  Shirt simulation with hospital gown: Minimum  assistance  Cues: Physical assistance; Tactile cues provided;Verbal cues provided    Lower Body Dressing Assistance  Shoes with Velcro: Total assistance (dependent) (carolyn cam boots)    Cognitive Retraining  Safety/Judgement: Awareness of environment; Fall prevention;Decreased insight into deficits;Good awareness of safety precautions    Functional Measure:    Barthel Index:  Bathin  Bladder: 5  Bowels: 10  Groomin  Dressin  Feeding: 10  Mobility: 0  Stairs: 0  Toilet Use: 0  Transfer (Bed to Chair and Back): 0  Total: 35/100      The Barthel ADL Index: Guidelines  1. The index should be used as a record of what a patient does, not as a record of what a patient could do. 2. The main aim is to establish degree of independence from any help, physical or verbal, however minor and for whatever reason. 3. The need for supervision renders the patient not independent. 4. A patient's performance should be established using the best available evidence.  Asking the patient, friends/relatives and nurses are the usual sources, but direct observation and common sense are also important. However direct testing is not needed. 5. Usually the patient's performance over the preceding 24-48 hours is important, but occasionally longer periods will be relevant. 6. Middle categories imply that the patient supplies over 50 per cent of the effort. 7. Use of aids to be independent is allowed. Score Interpretation (from [address])    Independent   60-79 Minimally independent   40-59 Partially dependent   20-39 Very dependent   <20 Totally dependent     -Rena Lieberman., Barthel, DEphraimW. (1965). Functional evaluation: the Barthel Index. [address] ([address] (1997). The Barthel activities of daily living index: self-reporting versus actual performance in the old (> or = 75 years). Journal of [address] [place] [address] 45(7), [address] [facility], JGARY, Naa Jones., Seth Roca (1999). Measuring the change in disability after inpatient rehabilitation; comparison of the responsiveness of the Barthel Index and Functional [place] Measure. Journal of Neurology, Neurosurgery, and Psychiatry, 66(4), 682-699. Ana Bills, N.J.A, SHERINE Fang.ALVERTO, & Zully Nunes M.A. (2004) Assessment of post-stroke quality of life in cost-effectiveness studies: The usefulness of the Barthel Index and the EuroQoL-5D. Quality of Life Research, 13, 999-68      Based on the above components, the patient evaluation is determined to be of the following complexity level: MEDIUM  Pain Rating:  Patient c/o carolyn ankle pain. RN aware and following. Activity Tolerance:   Fair and requires rest breaks    After treatment patient left in no apparent distress:    Sitting EOB with call bell in reach, RN aware    COMMUNICATION/EDUCATION:   The patients plan of care was discussed with: Physical therapist and Registered nurse.      Home safety education was provided and the patient/caregiver indicated understanding., Patient/family have participated as able in goal setting and plan of care. , and Patient/family agree to work toward stated goals and plan of care. This patients plan of care is appropriate for delegation to BAKARI.     Thank you for this referral.  Richardson Fuentes OTR/L  Time Calculation: 31 mins

## 2022-11-01 PROCEDURE — 74011000250 HC RX REV CODE- 250: Performed by: STUDENT IN AN ORGANIZED HEALTH CARE EDUCATION/TRAINING PROGRAM

## 2022-11-01 PROCEDURE — 65270000029 HC RM PRIVATE

## 2022-11-01 PROCEDURE — 74011250637 HC RX REV CODE- 250/637: Performed by: ORTHOPAEDIC SURGERY

## 2022-11-01 PROCEDURE — 97110 THERAPEUTIC EXERCISES: CPT

## 2022-11-01 PROCEDURE — 74011250637 HC RX REV CODE- 250/637: Performed by: STUDENT IN AN ORGANIZED HEALTH CARE EDUCATION/TRAINING PROGRAM

## 2022-11-01 PROCEDURE — 94760 N-INVAS EAR/PLS OXIMETRY 1: CPT

## 2022-11-01 PROCEDURE — 97530 THERAPEUTIC ACTIVITIES: CPT

## 2022-11-01 PROCEDURE — 74011250636 HC RX REV CODE- 250/636: Performed by: ORTHOPAEDIC SURGERY

## 2022-11-01 RX ADMIN — ACETAMINOPHEN 650 MG: 325 TABLET ORAL at 05:38

## 2022-11-01 RX ADMIN — ACETAMINOPHEN 650 MG: 325 TABLET ORAL at 04:05

## 2022-11-01 RX ADMIN — ENOXAPARIN SODIUM 30 MG: 100 INJECTION SUBCUTANEOUS at 09:20

## 2022-11-01 RX ADMIN — NADOLOL 40 MG: 40 TABLET ORAL at 09:20

## 2022-11-01 RX ADMIN — SODIUM CHLORIDE, PRESERVATIVE FREE 10 ML: 5 INJECTION INTRAVENOUS at 22:00

## 2022-11-01 RX ADMIN — SODIUM CHLORIDE, PRESERVATIVE FREE 10 ML: 5 INJECTION INTRAVENOUS at 05:56

## 2022-11-01 RX ADMIN — OXYCODONE 5 MG: 5 TABLET ORAL at 04:05

## 2022-11-01 RX ADMIN — TRIAMTERENE AND HYDROCHLOROTHIAZIDE 1 TABLET: 37.5; 25 TABLET ORAL at 09:19

## 2022-11-01 RX ADMIN — Medication 1 AMPULE: at 09:18

## 2022-11-01 RX ADMIN — SENNOSIDES AND DOCUSATE SODIUM 2 TABLET: 50; 8.6 TABLET ORAL at 09:19

## 2022-11-01 RX ADMIN — ACETAMINOPHEN 650 MG: 325 TABLET ORAL at 17:41

## 2022-11-01 RX ADMIN — ENOXAPARIN SODIUM 30 MG: 100 INJECTION SUBCUTANEOUS at 21:22

## 2022-11-01 RX ADMIN — SENNOSIDES AND DOCUSATE SODIUM 2 TABLET: 50; 8.6 TABLET ORAL at 17:40

## 2022-11-01 RX ADMIN — SODIUM CHLORIDE, PRESERVATIVE FREE 10 ML: 5 INJECTION INTRAVENOUS at 17:40

## 2022-11-01 RX ADMIN — ACETAMINOPHEN 650 MG: 325 TABLET ORAL at 11:28

## 2022-11-01 NOTE — PROGRESS NOTES
Problem: Mobility Impaired (Adult and Pediatric)  Goal: *Acute Goals and Plan of Care (Insert Text)  Description: FUNCTIONAL STATUS PRIOR TO ADMISSION: Patient was independent and without use of DME her her home. Patient was modified independent for basic and instrumental ADLs. For the last week, has been mostly bed bound following ankle fracture a week ago. HOME SUPPORT PRIOR TO ADMISSION: The patient lived with spouse and required moderate assistance for lower body dressing. Physical Therapy Goals  Initiated 10/31/2022  1. Patient will move from supine to sit and sit to supine , scoot up and down, and roll side to side in bed with minimal assistance/contact guard assist within 7 day(s). 2.  Patient will transfer from bed to chair and chair to bed with minimal assistance/contact guard assist using the least restrictive device within 7 day(s). 3.  Patient will perform sit to stand with minimal assistance assist within 7 day(s). 4.  Patient will be supervision with the performance of an exercise program within 7 days. Outcome: Not Progressing Towards Goal   PHYSICAL THERAPY TREATMENT  Patient: Marya Martinez (13 y.o. female)  Date: 11/1/2022  Diagnosis: Closed right ankle fracture [S82.891A] <principal problem not specified>  Procedure(s) (LRB):  RIGHT ANKLE OPEN REDUCTION INTERNAL FIXATION (Right) 2 Days Post-Op  Precautions: Fall, NWB (RLE NWB; L ankle WBAT due to sprain; has B cam boots)  Chart, physical therapy assessment, plan of care and goals were reviewed. ASSESSMENT  Patient continues with skilled PT services and is progressing towards goals. Pt presents with decreased strength and increased pain. Pt preformed bed mobility at mod A x2. Pt with difficulty getting LE on the floor. Pt able to preformed LE exercise sitting EOB. Pt declined stand due to decreased strength. Pt reported increased pain with LEs not on the floor. Pts pain and decreased strength limiting mobility.       Current Level of Function Impacting Discharge (mobility/balance): bed mobility at mod Ax2    Other factors to consider for discharge: decreased strength , pain, decreased endurance, NWB         PLAN :  Patient continues to benefit from skilled intervention to address the above impairments. Continue treatment per established plan of care. to address goals. Recommendation for discharge: (in order for the patient to meet his/her long term goals)  Therapy up to 5 days/week in SNF setting    This discharge recommendation:  Has been made in collaboration with the attending provider and/or case management    IF patient discharges home will need the following DME: wheelchair       SUBJECTIVE:   Patient stated  I cant do a lot.     OBJECTIVE DATA SUMMARY:   Critical Behavior:  Neurologic State: Alert  Orientation Level: Oriented X4  Cognition: Appropriate decision making, Appropriate safety awareness, Follows commands  Safety/Judgement: Awareness of environment, Fall prevention, Decreased insight into deficits, Good awareness of safety precautions  Functional Mobility Training:  Bed Mobility:  Rolling: Minimum assistance;Contact guard assistance  Supine to Sit: Moderate assistance;Assist x2  Sit to Supine: Moderate assistance;Assist x2  Scooting: Maximum assistance;Assist x2        Balance:  Sitting: Intact  Therapeutic Exercises:   Seated LAQ x5  Marching x5   Pain Rating:  Pt reported increased pain with mobility. Activity Tolerance:   Fair and requires rest breaks    After treatment patient left in no apparent distress:   Supine in bed, Call bell within reach, Bed / chair alarm activated, and Side rails x 3    COMMUNICATION/COLLABORATION:   The patients plan of care was discussed with: Registered nurse.      Paty Holman PTA   Time Calculation: 24 mins

## 2022-11-01 NOTE — DISCHARGE INSTRUCTIONS
Open Reduction With Internal Fixation of a Limb: What to Expect at [address] can expect some pain and swelling around the cut (incision) the doctor made. This should get better within a few days after your surgery. But it is normal to have some pain for 2 to 3 weeks after surgery and mild pain for up to 6 weeks after surgery. How soon you can return to work and your normal routine depends on your job and how long it takes the bone to heal. For example, if you have a fractured leg and you sit at work, you may be able to go back in 1 to 2 weeks. But if your job requires you to walk or stand a lot, you will need to wait until your fracture has healed before you go back to work. This care sheet gives you a general idea about how long it will take for you to recover. But each person recovers at a different pace. Follow the steps below to get better as quickly as possible. How can you care for yourself at home? Activity  Rest when you feel tired. Getting enough sleep will help you recover. Increase your activity as recommended by your doctor. Being active boosts blood flow and helps prevent pneumonia and constipation. It is usually okay to exercise other parts of your body as soon as you feel well enough. Avoid putting weight on your repaired bone until your doctor says it is okay. You will probably need to take 1 to 2 weeks off from work. It depends on the type of work you do and how you feel. Do not shower for 1 or 2 days after surgery. When you shower, keep your dressing and incisions dry. If you have a cast, cover it with plastic so that it does not get wet. It may help to sit on a shower stool. Do not take a bath, swim, use a hot tub, or soak your affected limb until your incision is healed. This usually takes 1 to 2 weeks. Diet  You can eat your normal diet. If your stomach is upset, try bland, low-fat foods like plain rice, broiled chicken, toast, and yogurt.   Medicines  Take pain medicines exactly as directed. If the doctor gave you a prescription medicine for pain, take it as prescribed. If you are not taking a prescription pain medicine, take an over-the-counter medicine that your doctor recommends. Read and follow all instructions on the label. Do not take aspirin, ibuprofen (Advil, Motrin), naproxen (Aleve), or other nonsteroidal anti-inflammatory drugs (NSAIDs) unless you talk to your doctor first.  Farhana Oleary not take two or more pain medicines at the same time unless the doctor told you to. Many pain medicines have acetaminophen, which is Tylenol. Too much acetaminophen (Tylenol) can be harmful. If you think your pain medicine is making you sick to your stomach: Take your medicine after meals (unless your doctor has told you not to). Ask your doctor for a different pain medicine. If your doctor prescribed antibiotics, take them as directed. Do not stop taking them just because you feel better. You need to take the full course of antibiotics. Incision care  If you have strips of tape on the incision, leave the tape on for a week or until it falls off. If you do not have a cast, clean the incision two times a day after your doctor allows you to remove the bandage. Use only soap and water to clean the incision unless your doctor gives you different instructions. Other cleaning products, such as hydrogen peroxide, can make the wound heal more slowly. You may cover the wound with a thin layer of antibiotic ointment, such as bacitracin, and a nonstick bandage if the wound weeps or rubs against clothing. Change the bandage every day. Exercise  Do range-of-motion exercises as instructed by your doctor or physical therapist. These exercises will help keep your muscles strong and your joints flexible while your bone is healing. Wiggle your fingers or toes on the injured arm or leg often. This helps reduce swelling and stiffness.   Ice and elevation  Prop up the injured arm or leg on a pillow when you ice it or anytime you sit or lie down during the first 1 to 2 weeks after your surgery. Try to keep it above the level of your heart. This will help reduce swelling and pain. Other instructions  If you have a cast or splint:  Keep it dry. If you have a removable splint, ask your doctor if it is okay to take it off to bathe. Your doctor may want you to keep it on as much as possible. Do not stick objects such as pencils or coat hangers in your cast or splint to scratch your skin. Do not put powder into your cast or splint to relieve itchy skin. Never cut or alter your cast or splint. Follow-up care is a key part of your treatment and safety. Be sure to make and go to all appointments, and call your doctor if you are having problems. It's also a good idea to know your test results and keep a list of the medicines you take. When should you call for help? Call 911 anytime you think you may need emergency care. For example, call if:  You passed out (lost consciousness). You have severe trouble breathing. You have sudden chest pain and shortness of breath, or you cough up blood. Call your doctor now or seek immediate medical care if:  You have pain that does not get better after you take pain medicine. Your fingers or toes on the injured arm or leg are cool, pale, or change color. You have tingling or numbness in your fingers or toes. You cannot move your fingers or toes. Your cast or splint feels too tight. The skin under your cast or splint is burning or stinging. You have signs of infection, such as: Increased pain, swelling, warmth, or redness. Red streaks leading from the incision. Pus draining from the incision. Swollen lymph nodes in your neck, armpits, or groin. A fever. You have drainage or a bad smell coming from the cast or splint. You have signs of a blood clot, such as:  Pain in your calf, back of the knee, thigh, or groin. Redness and swelling in your leg or groin.   Watch closely for any changes in your health, and be sure to contact your doctor if:  You have any problems with your cast or splint. Where can you learn more? Go to [employer]  Enter Q268 in the search box to learn more about \"Open Reduction With Internal Fixation of a Limb: What to Expect at Home. \"   © 7815-9472 Healthwise, Incorporated. Care instructions adapted under license by [address] [place] [address] (which disclaims liability or warranty for this information). This care instruction is for use with your licensed healthcare professional. If you have questions about a medical condition or this instruction, always ask your healthcare professional. [place] [address] any warranty or liability for your use of this information. Content Version: 7.7.20271; Last Revised: April 15, 2010           HOSPITALIST DISCHARGE INSTRUCTIONS    NAME: Maureen Arora   :  1945   MRN:  [id number]     Date/Time:  2022 12:31 PM    ADMIT DATE: 10/29/2022   DISCHARGE DATE: 2022     Attending Physician: Liane Moss MD    Diagnosis:  Right ankle fracture s/p ORIF  Hypokalemia  RENEA  Left axillary lymphadenopathy    Please follow up with your PMD for repeat imaging for:   CT findings of   1.2 x 1.4 cm left axillary lymph node    Medications: Per above medication reconciliation.     Pain Management: per above medications    Recommended diet: Cardiac Diet    Recommended activity: NWB to RLE    Wound care: None    Indwelling devices:  None    Supplemental Oxygen: None    Required Lab work: Per SNF routine    Glucose management:  Accucheck ACHS with sliding scale per SNF protocol    Code status: Full

## 2022-11-01 NOTE — OP NOTES
[address]  OPERATIVE REPORT    Name:  Chico Saucedo  MR#:  [id number]  :  1945  ACCOUNT #:  [de-identified]  DATE OF SERVICE:  10/30/2022    LOCATION:  [facility]. PREOPERATIVE DIAGNOSIS:  Bimalleolar right ankle fracture. POSTOPERATIVE DIAGNOSIS:  Bimalleolar right ankle fracture. PROCEDURE PERFORMED:  ORIF, right ankle fracture. SURGEON:  Nova Medina DO    ASSISTANT:  [facility] staff. ANESTHESIA:  General.    COMPLICATIONS:  None. SPECIMENS REMOVED:  None. IMPLANTS:  synthes 1/3 tub plate. ESTIMATED BLOOD LOSS:  Minimal.    DISPOSITION:  Stable to PACU. INDICATIONS FOR THE PROCEDURE:  The patient is a 68-year-old female who reportedly had a fall about a week ago and sustained a bimalleolar right ankle fracture with medial clear space widening. She was reduced and splinted, but notes that this splint became uncomfortable and so she took it off and laid in bed for a week. She was also told that she had a left ankle sprain at the time. She has had difficulty getting around at home and therefore presented to the ER one week later. She was admitted as she had been a week from her initial ankle fracture and we discussed treatment options. She elected to proceed with ORIF. We discussed the risks of infection, blood loss, neurovascular injury, anesthesia risk, and risk secondary to patient's comorbidities. Once she was medically optimized and ready for surgical treatment, she was taken to the OR. OPERATION:  The patient was taken to the OR and transferred to the operating room table. She was placed in the supine position. All prominences were carefully padded. She was given sedation and intubated by Anesthesia. Sterile prepping and draping was performed. After sterile prepping and draping, a time-out was called. The patient was identified by name, date of birth, operative site, and operative procedure.   After all were in agreement that the right ankle was the operative extremity, an incision was made for an approach to the distal fibula. Careful dissection down to avoid neurovascular structures was performed. We gained access to the fracture site. We cleared the fracture hematoma and we were able to use a sharp reduction tenaculum to obtain an anatomic reduction of the distal fibula fracture. We placed a lag screw in a lag-by-technique fashion with 3.5 mm cortical screw. We then chose a 7-hole Synthes one-third tibial plate to serve as a neutralization plate at the fracture site. We placed a combination of cortical screws proximal to the fracture and cancellous screws distal to the fracture and we did place one long cortical screw across the syndesmosis for added stability as her bone quality was poor. We thoroughly irrigated after checking for any syndesmotic instability. We closed with a combination of 0 Vicryl, 2-0 Vicryl, and staples. Sterile dressings were applied and the patient was awoken by Anesthesia after a splint was placed. The patient will be kept in the hospital and likely discharge to rehab facility.         Maninder Zamora DO [id number]  D:  11/01/2022 8:41  T:  11/01/2022 11:01  JOB #:  [id number]

## 2022-11-01 NOTE — PROGRESS NOTES
Bedside shift change report given to DERRICK Lane (oncoming nurse) by Iglesia Hood (offgoing nurse). Report included the following information SBAR, Kardex, Procedure Summary, Intake/Output, MAR, Accordion, and Recent Results.

## 2022-11-01 NOTE — PROGRESS NOTES
Pt confirmed she is in agreement with going to [facility] at discharge. SW contacted [facility] [facility] who stated they will have a bed for pt tomorrow. Info sent to [facility] requesting auth for rehab services today. Hope to Iris Likens back tomorrow. SW will continue to follow and assist with additional discharge needs.     Haydee Jc, VIOLETTE  Ext [phone]

## 2022-11-01 NOTE — PROGRESS NOTES
Hospitalist Progress Note    NAME: Maureen Arora   :  1945   MRN:  [id number]       Assessment / Plan:  Right ankle fracture  Suspected left ankle sprain of ATFL  -Appreciate Ortho input   -S/p ORIF   -PT/OT consults in AM   -Case Management to assist with DC planning, will likely need SNF vs IPR   -Continue bowel regimen   -PRN pain management with oxycodone and morphine   -Encourage IS    Hypokalemia  -resolved   -Replete with PO   -Trend BMP     Obesity class III by BMI 47.7 with suspected obesity hypoventilation syndrome  History asthma  RENEA not on CPAP  -CTA ruled out PE  -Incidentally showed Chest wall structures show a 1.2 x 1.4 cm left axillary lymph node  Recommend OP follow up   -Noted chronic bicarb elevation likely compensatory for chronic CO2 retention in setting of super morbid obesity and pickwickian syndrome  Patient declined inpatient CPAP-reports she has had 3 studies but is not prescribed a CPAP at present as she was instructed to come back for repeat testing-likely for bilevel evaluation  -PRN nebs  -Monitor respiratory status    Essential hypertension  -Continue PTA: Nadolol, Dyazide for hypertension  -Monitor BP, slightly elevated this AM likely rlt to pain     40 or above Morbid obesity / Body mass index is 47.72 kg/m². Estimated discharge date:   Barriers: SNF vs IPR     Code status: Full  Prophylaxis: Lovenox  Recommended Disposition:  SNF vs IPR      Subjective:     Chief Complaint / Reason for Physician Visit  Patient seen at bedside post surgery. stable to be d/c , need placement   Review of Systems:  Symptom Y/N Comments  Symptom Y/N Comments   Fever/Chills n   Chest Pain     Poor Appetite    Edema     Cough    Abdominal Pain     Sputum    Joint Pain y    SOB/LOPEZ n   Pruritis/Rash     Nausea/vomit    Tolerating PT/OT     Diarrhea    Tolerating Diet y    Constipation    Other       Could NOT obtain due to:      Objective:     VITALS:   Last 24hrs VS reviewed since prior progress note. Most recent are:  Patient Vitals for the past 24 hrs:   Temp Pulse Resp BP SpO2   11/01/22 1115 98.6 °F (37 °C) 72 16 (!) 146/71 96 %   11/01/22 0736 97.6 °F (36.4 °C) 74 18 113/79 96 %   11/01/22 0407 97.9 °F (36.6 °C) 66 16 139/81 94 %   10/31/22 2004 98 °F (36.7 °C) 69 18 91/68 94 %   10/31/22 1549 97.9 °F (36.6 °C) 72 18 (!) 140/81 96 %         Intake/Output Summary (Last 24 hours) at 11/1/2022 1154  Last data filed at 11/1/2022 0844  Gross per 24 hour   Intake 590 ml   Output 300 ml   Net 290 ml          I had a face to face encounter and independently examined this patient on 11/1/2022, as outlined below:  PHYSICAL EXAM:  General: WD, WN. Alert, cooperative, no acute distress    EENT:  EOMI. Anicteric sclerae. MMM  Resp:  CTA bilaterally, no wheezing or rales. No accessory muscle use  CV:  Regular  rhythm,  No edema  GI:  Soft, obese, Non tender. +Bowel sounds  Neurologic:  Alert and oriented X 3, normal speech,   Psych:   Good insight. Not anxious nor agitated  Skin:  No rashes. No jaundice    Reviewed most current lab test results and cultures  YES  Reviewed most current radiology test results   YES  Review and summation of old records today    NO  Reviewed patient's current orders and MAR    YES  PMH/SH reviewed - no change compared to H&P  ________________________________________________________________________  Care Plan discussed with:    Comments   Patient x    Family      RN     Care Manager x    Consultant                        Multidiciplinary team rounds were held today with , nursing, pharmacist and clinical coordinator. Patient's plan of care was discussed; medications were reviewed and discharge planning was addressed.      ________________________________________________________________________  Total NON critical care TIME:  35   Minutes    Total CRITICAL CARE TIME Spent:   Minutes non procedure based      Comments   >50% of visit spent in counseling and coordination of care x    ________________________________________________________________________  Samantha Mccray MD     Procedures: see electronic medical records for all procedures/Xrays and details which were not copied into this note but were reviewed prior to creation of Plan. LABS:  I reviewed today's most current labs and imaging studies. Pertinent labs include:  Recent Labs     10/30/22  0312 10/29/22  2043   WBC 8.1 8.5   HGB 11.1* 11.3*   HCT 35.6 35.7    210       Recent Labs     10/31/22  0200 10/30/22  0312 10/30/22  0028 10/29/22  2043    140  --  140   K 3.6 3.4*  --  3.1*   CL 99 98  --  98   CO2 33* 35*  --  34*   * 98  --  117*   BUN 20 14  --  13   CREA 1.05* 0.81  --  0.80   CA 10.0 10.2*  --  10.0   MG  --  1.8  --   --    ALB  --   --   --  2.7*   TBILI  --   --   --  0.9   ALT  --   --   --  29   INR  --   --  1.1  --          Signed:  Samantha Mccray MD

## 2022-11-02 VITALS
OXYGEN SATURATION: 96 % | TEMPERATURE: 98.3 F | DIASTOLIC BLOOD PRESSURE: 68 MMHG | HEART RATE: 71 BPM | SYSTOLIC BLOOD PRESSURE: 144 MMHG | BODY MASS INDEX: 47.73 KG/M2 | HEIGHT: 63 IN | WEIGHT: 269.4 LBS | RESPIRATION RATE: 16 BRPM

## 2022-11-02 LAB
COVID-19 RAPID TEST, COVR: NOT DETECTED
SOURCE, COVRS: NORMAL

## 2022-11-02 PROCEDURE — 74011250637 HC RX REV CODE- 250/637: Performed by: STUDENT IN AN ORGANIZED HEALTH CARE EDUCATION/TRAINING PROGRAM

## 2022-11-02 PROCEDURE — 74011000250 HC RX REV CODE- 250: Performed by: STUDENT IN AN ORGANIZED HEALTH CARE EDUCATION/TRAINING PROGRAM

## 2022-11-02 PROCEDURE — 94760 N-INVAS EAR/PLS OXIMETRY 1: CPT

## 2022-11-02 PROCEDURE — 74011250636 HC RX REV CODE- 250/636: Performed by: ORTHOPAEDIC SURGERY

## 2022-11-02 PROCEDURE — 74011250637 HC RX REV CODE- 250/637: Performed by: ORTHOPAEDIC SURGERY

## 2022-11-02 PROCEDURE — 87635 SARS-COV-2 COVID-19 AMP PRB: CPT

## 2022-11-02 RX ORDER — AMOXICILLIN 250 MG
2 CAPSULE ORAL 2 TIMES DAILY
Qty: 60 TABLET | Refills: 0 | Status: SHIPPED
Start: 2022-11-02

## 2022-11-02 RX ORDER — OXYCODONE AND ACETAMINOPHEN 5; 325 MG/1; MG/1
1 TABLET ORAL
Qty: 12 TABLET | Refills: 0 | Status: SHIPPED | OUTPATIENT
Start: 2022-11-02 | End: 2022-11-05

## 2022-11-02 RX ORDER — OXYCODONE AND ACETAMINOPHEN 5; 325 MG/1; MG/1
1-2 TABLET ORAL
Qty: 12 TABLET | Refills: 0 | Status: SHIPPED | OUTPATIENT
Start: 2022-11-02 | End: 2022-11-02 | Stop reason: SDUPTHER

## 2022-11-02 RX ADMIN — Medication 1 AMPULE: at 05:36

## 2022-11-02 RX ADMIN — TRIAMTERENE AND HYDROCHLOROTHIAZIDE 1 TABLET: 37.5; 25 TABLET ORAL at 09:37

## 2022-11-02 RX ADMIN — Medication 1 AMPULE: at 09:48

## 2022-11-02 RX ADMIN — SENNOSIDES AND DOCUSATE SODIUM 2 TABLET: 50; 8.6 TABLET ORAL at 09:36

## 2022-11-02 RX ADMIN — ACETAMINOPHEN 650 MG: 325 TABLET ORAL at 12:04

## 2022-11-02 RX ADMIN — ACETAMINOPHEN 650 MG: 325 TABLET ORAL at 05:51

## 2022-11-02 RX ADMIN — ENOXAPARIN SODIUM 30 MG: 100 INJECTION SUBCUTANEOUS at 09:37

## 2022-11-02 RX ADMIN — SODIUM CHLORIDE, PRESERVATIVE FREE 10 ML: 5 INJECTION INTRAVENOUS at 05:22

## 2022-11-02 RX ADMIN — NADOLOL 40 MG: 40 TABLET ORAL at 09:48

## 2022-11-02 NOTE — PROGRESS NOTES
Problem: Falls - Risk of  Goal: *Absence of Falls  Description: Document Erin Valverde Fall Risk and appropriate interventions in the flowsheet. Outcome: Progressing Towards Goal  Note: Fall Risk Interventions:  Mobility Interventions: Bed/chair exit alarm         Medication Interventions: Bed/chair exit alarm    Elimination Interventions: Call light in reach, Patient to call for help with toileting needs    History of Falls Interventions: Bed/chair exit alarm, Room close to nurse's station         Problem: Patient Education: Go to Patient Education Activity  Goal: Patient/Family Education  Outcome: Progressing Towards Goal     Problem: Pressure Injury - Risk of  Goal: *Prevention of pressure injury  Description: Document Hitesh Scale and appropriate interventions in the flowsheet.   Outcome: Progressing Towards Goal  Note: Pressure Injury Interventions:  Sensory Interventions: Assess changes in LOC    Moisture Interventions: Absorbent underpads    Activity Interventions: Assess need for specialty bed    Mobility Interventions: PT/OT evaluation    Nutrition Interventions: Document food/fluid/supplement intake    Friction and Shear Interventions: Lift sheet                Problem: Patient Education: Go to Patient Education Activity  Goal: Patient/Family Education  Outcome: Progressing Towards Goal     Problem: Patient Education: Go to Patient Education Activity  Goal: Patient/Family Education  Outcome: Progressing Towards Goal     Problem: Lower Extremity Fracture:Day of Surgery (Intiate SCIP Measures for Post-op Care)  Goal: Off Pathway (Use only if patient is Off Pathway)  Outcome: Progressing Towards Goal  Goal: Activity/Safety  Outcome: Progressing Towards Goal  Goal: Consults, if ordered  Outcome: Progressing Towards Goal  Goal: Diagnostic Test/Procedures  Outcome: Progressing Towards Goal  Goal: Nutrition/Diet  Outcome: Progressing Towards Goal  Goal: Medications  Outcome: Progressing Towards Goal  Goal: Respiratory  Outcome: Progressing Towards Goal  Goal: Treatments/Interventions/Procedures  Outcome: Progressing Towards Goal  Goal: Psychosocial  Outcome: Progressing Towards Goal  Goal: *Optimal pain control at patient's stated goal  Outcome: Progressing Towards Goal  Goal: *Hemodynamically stable  Outcome: Progressing Towards Goal  Goal: *Adequate oxygenation  Outcome: Progressing Towards Goal     Problem: Lower Extremity Fracture:Post-op Day 1  Goal: Off Pathway (Use only if patient is Off Pathway)  Outcome: Progressing Towards Goal  Goal: Activity/Safety  Outcome: Progressing Towards Goal  Goal: Consults, if ordered  Outcome: Progressing Towards Goal  Goal: Diagnostic Test/Procedures  Outcome: Progressing Towards Goal  Goal: Nutrition/Diet  Outcome: Progressing Towards Goal  Goal: Discharge Planning  Outcome: Progressing Towards Goal  Goal: Medications  Outcome: Progressing Towards Goal  Goal: Respiratory  Outcome: Progressing Towards Goal  Goal: Treatments/Interventions/Procedures  Outcome: Progressing Towards Goal  Goal: Psychosocial  Outcome: Progressing Towards Goal  Goal: *Optimal pain control at patient's stated goal  Outcome: Progressing Towards Goal  Goal: *Hemodynamically stable  Outcome: Progressing Towards Goal  Goal: *Adequate oxygenation  Outcome: Progressing Towards Goal  Goal: *PT/INR within defined limits  Outcome: Progressing Towards Goal  Goal: *Demonstrates progressive activity  Outcome: Progressing Towards Goal     Problem: Lower Extremity Fracture:Post-op Day 2  Goal: Off Pathway (Use only if patient is Off Pathway)  Outcome: Progressing Towards Goal  Goal: Activity/Safety  Outcome: Progressing Towards Goal  Goal: Diagnostic Test/Procedures  Outcome: Progressing Towards Goal  Goal: Nutrition/Diet  Outcome: Progressing Towards Goal  Goal: Discharge Planning  Outcome: Progressing Towards Goal  Goal: Medications  Outcome: Progressing Towards Goal  Goal: Respiratory  Outcome: Progressing Towards Goal  Goal: Treatments/Interventions/Procedures  Outcome: Progressing Towards Goal  Goal: Psychosocial  Outcome: Progressing Towards Goal  Goal: *Optimal pain control at patient's stated goal  Outcome: Progressing Towards Goal  Goal: *Hemodynamically stable  Outcome: Progressing Towards Goal  Goal: *Adequate oxygenation  Outcome: Progressing Towards Goal  Goal: *PT/INR within defined limits  Outcome: Progressing Towards Goal  Goal: *Demonstrates progressive activity  Outcome: Progressing Towards Goal  Goal: *Voiding  Outcome: Progressing Towards Goal  Goal: *Bowel movement  Outcome: Progressing Towards Goal  Goal: *Tolerating diet  Outcome: Progressing Towards Goal     Problem: Lower Extremity Fracture:Post-op Day 3  Goal: Off Pathway (Use only if patient is Off Pathway)  Outcome: Progressing Towards Goal  Goal: Activity/Safety  Outcome: Progressing Towards Goal  Goal: Diagnostic Test/Procedures  Outcome: Progressing Towards Goal  Goal: Nutrition/Diet  Outcome: Progressing Towards Goal  Goal: Discharge Planning  Outcome: Progressing Towards Goal  Goal: Medications  Outcome: Progressing Towards Goal  Goal: Respiratory  Outcome: Progressing Towards Goal  Goal: Treatments/Interventions/Procedures  Outcome: Progressing Towards Goal  Goal: Psychosocial  Outcome: Progressing Towards Goal  Goal: *Optimal pain control at patient's stated goal  Outcome: Progressing Towards Goal  Goal: *Hemodynamically stable  Outcome: Progressing Towards Goal  Goal: *Adequate oxygenation  Outcome: Progressing Towards Goal  Goal: *PT/INR within defined limits  Outcome: Progressing Towards Goal  Goal: *Demonstrates progressive activity  Outcome: Progressing Towards Goal  Goal: *Voiding  Outcome: Progressing Towards Goal  Goal: *Bowel movement  Outcome: Progressing Towards Goal  Goal: *Tolerating diet  Outcome: Progressing Towards Goal

## 2022-11-02 NOTE — PROGRESS NOTES
Problem: Falls - Risk of  Goal: *Absence of Falls  Description: Document Diana Marking Fall Risk and appropriate interventions in the flowsheet.   Outcome: Resolved/Met  Note: Fall Risk Interventions:  Mobility Interventions: Bed/chair exit alarm         Medication Interventions: Bed/chair exit alarm    Elimination Interventions: Call light in reach, Patient to call for help with toileting needs    History of Falls Interventions: Bed/chair exit alarm, Room close to nurse's station

## 2022-11-02 NOTE — DISCHARGE SUMMARY
Hospitalist Discharge Summary     Patient ID:  Gerry Xie  [id number]  40 y.o.  1945  10/29/2022    PCP on record: Alma Corrales MD    Admit date: 10/29/2022  Discharge date and time: 11/2/2022    DISCHARGE DIAGNOSIS:    Right ankle fracture s/p ORIF  Hypokalemia  RENEA    CONSULTATIONS:  IP CONSULT TO ORTHOPEDIC SURGERY  IP CONSULT TO HOSPITALIST    Excerpted HPI from H&P of Alexandrarachel Austin DO:  Terrell Richard is a 78 y.o.  female with pertinent past medical history as listed below who presents with complaints of right greater than left ankle pain. Patient reports experiencing mechanical fall 1 week prior at which time she was evaluated at outside emergency department found to have right ankle fracture and was placed in walking boot, but reports this was too uncomfortable to tolerate and has remained in bed for the past week as she reports inability to bear weight on either ankle secondary to pain. Today, patient reports episode of shortness of breath with central pain of moderate intensity lasting approximately 1 hour prior to spontaneously resolving. At present, patient reports no chest discomfort or change from baseline respiratory status. ROS otherwise negative. In the ED, patient afebrile and hemodynamically stable (hypertensive 150s/90s) saturating upper 90s on room air. ECG demonstrates normal sinus rhythm without definitive ischemic changes. CXR demonstrates no acute process. Right ankle radiographs demonstrate fracture with dislocation status post splinting which demonstrates lateral malleolus fracture with widened medial mortise piece. Left ankle radiographs demonstrate no acute abnormality. Orthopedics consulted in the ED recommending medicine admission with tentative plans for ORIF in the morning.   Labs demonstrate: Unremarkable CBC, proBNP 343, sodium 140, potassium 3.1, CO2 34 (chronic), BUN 13, creatinine 0.80, high sensitive troponin 11, INR 1.1. CTA chest pending radiologist read.       ______________________________________________________________________  DISCHARGE SUMMARY/HOSPITAL COURSE:  for full details see H&P, daily progress notes, labs, consult notes. Right ankle fracture  Suspected left ankle sprain of ATFL  -Appreciate Ortho input   -S/p ORIF   D/c to snf today  -Continue bowel regimen   Oxycodone PRN  Xarelto 10 mg daily for 30 days on discharge per ortho recs for DVT ppx     Hypokalemia  -resolved     Obesity class III by BMI 47.7 with suspected obesity hypoventilation syndrome  History asthma  RENEA not on CPAP  -CTA ruled out PE  -Incidentally showed Chest wall structures show a 1.2 x 1.4 cm left axillary lymph node  Recommend OP follow up   -Noted chronic bicarb elevation likely compensatory for chronic CO2 retention in setting of super morbid obesity and pickwickian syndrome  Patient declined inpatient CPAP-reports she has had 3 studies but is not prescribed a CPAP at present as she was instructed to come back for repeat testing-likely for bilevel evaluation  -PRN nebs  -Monitor respiratory status    Essential hypertension  -Continue PTA: Nadolol, Dyazide for hypertension          _______________________________________________________________________  Patient seen and examined by me on discharge day. Pertinent Findings:  Gen:    Not in distress  Chest: Clear lungs  CVS:   Regular rhythm. No edema  Abd:  Soft, not distended, not tender  Neuro:  Alert,   _______________________________________________________________________  DISCHARGE MEDICATIONS:   Current Discharge Medication List        START taking these medications    Details   senna-docusate (PERICOLACE) 8.6-50 mg per tablet Take 2 Tablets by mouth two (2) times a day. Qty: 60 Tablet, Refills: 0  Start date: 11/2/2022      rivaroxaban (Xarelto) 10 mg tablet Take 1 Tablet by mouth daily (with breakfast).  For DVT prophylaxis after ankle fracture, stop after 1 month  Qty: 30 Tablet, Refills: 0  Start date: 11/2/2022           CONTINUE these medications which have CHANGED    Details   oxyCODONE-acetaminophen (Percocet) 5-325 mg per tablet Take 1 Tablet by mouth every six (6) hours as needed for Pain for up to 3 days. Max Daily Amount: 4 Tablets. Qty: 12 Tablet, Refills: 0  Start date: 11/2/2022, End date: 11/5/2022    Associated Diagnoses: Closed fracture of right ankle, initial encounter           CONTINUE these medications which have NOT CHANGED    Details   naproxen sodium 220 mg cap Take  by mouth as needed. triamterene-hydrochlorothiazide (DYAZIDE) 37.5-25 mg per capsule Take 1 Cap by mouth daily. Qty: 30 Cap, Refills: 5    Associated Diagnoses: Essential hypertension, benign      nadolol (CORGARD) 40 mg tablet Take 1 Tab by mouth daily. Qty: 30 Tab, Refills: 5    Associated Diagnoses: Essential hypertension, benign      albuterol (PROVENTIL, VENTOLIN) 90 mcg/actuation inhaler Take 2 Puffs by inhalation every four (4) hours as needed. Qty: 10 g, Refills: 1    Comments: DUE APPT PLEASE CALL  Associated Diagnoses: Asthma      promethazine (PHENERGAN) 12.5 mg tablet Take 1 Tab by mouth every six (6) hours as needed for Nausea. Qty: 20 Tab, Refills: 0               Patient Follow Up Instructions:    Activity: NWB to RLE  Diet: Cardiac Diet  Wound Care: None needed    Follow-up with   Follow-up Information       Follow up With Specialties Details Why Contact Info    Claude Drier, MD [address]. [phone]      [address]  [phone]          ________________________________________________________________    Risk of deterioration: Moderate    Condition at Discharge: Stable  __________________________________________________________________    Disposition  SNF/LTC    ____________________________________________________________________    Code Status: Full Code  ___________________________________________________________________      Total time in minutes spent coordinating this discharge (includes going over instructions, follow-up, prescriptions, and preparing report for sign off to her PCP) :  32 minutes    Signed:  William Underwood MD

## 2022-11-02 NOTE — PROGRESS NOTES
Attempt made to call report to [facility] at . Nurse currently unavailable. Number provided to call back to receive report.

## 2022-11-02 NOTE — PROGRESS NOTES
Problem: Falls - Risk of  Goal: *Absence of Falls  Description: Document Vicky Turcios Fall Risk and appropriate interventions in the flowsheet.   Outcome: Resolved/Met  Note: Fall Risk Interventions:  Mobility Interventions: Bed/chair exit alarm         Medication Interventions: Bed/chair exit alarm    Elimination Interventions: Call light in reach, Patient to call for help with toileting needs    History of Falls Interventions: Bed/chair exit alarm, Room close to nurse's station

## 2022-11-02 NOTE — PROGRESS NOTES
Transition of Care Plan:    RUR: 11%  Disposition: SNF-[facility] auth approved  Follow up appointments:  N/A  DME needed: n/a  Transportation at Discharge: [facility] transport with a request time of 1P and approved  [place] or means to access home:    n/a    IM Medicare Letter: given prior to discharge   Caregiver Contact: Dtr- April  Discharge Caregiver contacted prior to discharge? April contacted and she conferenced other family members in on the call   Care Conference needed?:  N/A    Pt to discharge to SNF today. Pt made aware auth received. SW contacted [address] [place] [address],[address] [place] [address] and admissions can accept pt to room 306B. SW asked pt which family member to contact and pt asked me to contact her dtr April. SW spoke with April who stated she would like to add additional family members to the call. Family informed of the discharge plans and in agreement. Floor RN made aware of the discharge plans and provided the call report number. Transition of Care Plan to SNF/Rehab    Communication to Patient/Family:  Met with patient and family and they are agreeable to the transition plan. The Plan for Transition of Care is related to the following treatment goals: The Patient and/or patient representative was provided with a choice of provider and agrees  with the discharge plan. Yes [x] No []    A Freedom of choice list was provided with basic dialogue that supports the patient's individualized plan of care/goals and shares the quality data associated with the providers. Yes [x] No []    SNF/Rehab Transition:  Patient has been accepted to [address] [place] [address] SNF/Rehab and meets criteria for admission. Patient will transported by [facility] and expected to leave at 1P. Communication to SNF/Rehab:  Bedside RN, has been notified to update the transition plan to the facility and call report (phone number). Discharge information has been updated on the AVS. And communicated to facility via [employer], or CC link. Discharge instructions to be fax'd to facility. Nursing Please include all hard scripts for controlled substances, med rec and dc summary, and AVS in packet. Reviewed and confirmed with facility, [facility], can manage the patient care needs for the following:     Paulina Velasco with (X) only those applicable:  Medication:  []Medications are available at the facility  []IV Antibiotics    []Controlled Substance - hard copies available sent. []Weekly Labs    Equipment:  []CPAP/BiPAP  []Wound Vacuum  []Bell or Urinary Device  []PICC/Central Line  []Nebulizer  []Ventilator    Treatment:  []Isolation (for MRSA, VRE, etc.)  []Surgical Drain Management  []Tracheostomy Care  []Dressing Changes  []Dialysis with transportation  []PEG Care  []Oxygen  []Daily Weights for Heart Failure    Dietary:  []Any diet limitations  []Tube Feedings   []Total Parenteral Management (TPN)    Financial Resources:  []Medicaid Application Completed    []UAI Completed and copy given to pt/family  and copy given to pt/family  []A screening has previously been completed. []Level II Completed    [] Private pay individual who will not become   financially eligible for Medicaid within 6 months from admission to a [address] [place] [address]. [] Individual refused to have screening conducted. []Medicaid Application Completed    []The screening denied because it was determined individual did not need/did not qualify for nursing facility level of care. [] Out of state residents seeking direct admission to a [address] facility.   [] Individuals who are inpatients of an out of state hospital, or in state or out of state veterans/ hospital and seek direct admission to a [address] facility  [] Individuals who are pateints or residents of a state owned/operated facility that is licensed by [employer] and seek direct admission to [address] [place] [address]  [] A screening not required for enrollment in [facility] services as set out in [address] [place] [address] 30-  [] [facility]) staff shall perform screenings of the [facility] clients. Advanced Care Plan:  []Surrogate Decision Maker of Care  []POA  []Communicated Code Status and copy sent.     Other:          Jovan Mancuso, MSW  Ext [phone]

## 2022-11-02 NOTE — PROGRESS NOTES
ORTHO - Progress Note  Post Op day: 2 Days Post-Op    Bala Chadwick     [id number]  female    78 y.o.    1945    Admit date:10/29/2022  Date of Surgery:10/30/2022   Procedures:Procedure(s):RIGHT ANKLE OPEN REDUCTION INTERNAL FIXATION  Surgeon:Surgeon(s) and Role:   Farida Carey, DO - Primary        SUBJECTIVE:     Bala Chadwick is a 78 y.o. female resting in the bed. Family at bedside. Patient has complaints of appropriate post-op pain, tolerating PO pain medications PRN oxy 5mg      OBJECTIVE:       Physical Exam:  General: alert, cooperative, no distress. Gastrointestinal:  non-distended . Cardiovascular: equal pulses in the  lower extremities,  Brisk cap refill in all distal extremities   Genitourinary: Voiding independently   Respiratory: No respiratory distress   Neurological:Neurovascular exam within normal limits. Senstion intact: LE bilat. Motor: + DF/PF/EHL. Musculoskeletal: Josie's sign negative in bilateral lower extremities. Calves soft, supple, non-tender upon palpation or with passive stretch. Dressing/Wound:  Clean, dry and intact. No significant erythema or swelling. Bilat LE with CAM boots. Vital Signs:       No data found.                                        Temp (24hrs), Av °F (36.7 °C), Min:97.6 °F (36.4 °C), Max:98.6 °F (37 °C)    Date 22 0700 - 22 0659   Shift 1659-3799 4680-6630 8198-2622 24 Hour Total   INTAKE   P.O. 250   250   Shift Total(mL/kg) 250(2)   250(2)   OUTPUT   Shift Total(mL/kg)       Weight (kg) 122.2 122.2 122.2 122.2     Labs:        Recent Labs     10/30/22  0312 10/30/22  0028   HCT 35.6  --    HGB 11.1*  --    INR  --  1.1     PT/OT:              ASSESSMENT / PLAN:   Active Problems:    Closed right ankle fracture (10/30/2022)         -  Continue PT/OT --- NWB on the RLE  -  LLE CAM boot remove while in bed-  recommend donning boot when mobilizing/out of bed  -  DVT prophylaxis- SCD w/ Lovenox 30BID--- consider xarelto 10mg every day for 30 days at time of DC  -  DC planning - SNF    Signed By: Cheryl Shrestha PA-C

## 2022-11-10 ENCOUNTER — OFFICE VISIT (OUTPATIENT)
Dept: ORTHOPEDIC SURGERY | Age: 77
End: 2022-11-10
Payer: MEDICARE

## 2022-11-10 VITALS — HEIGHT: 63 IN | WEIGHT: 269 LBS | BODY MASS INDEX: 47.66 KG/M2

## 2022-11-10 DIAGNOSIS — S93.402D MODERATE LEFT ANKLE SPRAIN, SUBSEQUENT ENCOUNTER: ICD-10-CM

## 2022-11-10 DIAGNOSIS — S82.891D CLOSED FRACTURE OF RIGHT ANKLE WITH ROUTINE HEALING, SUBSEQUENT ENCOUNTER: Primary | ICD-10-CM

## 2022-11-10 PROCEDURE — 99024 POSTOP FOLLOW-UP VISIT: CPT | Performed by: ORTHOPAEDIC SURGERY

## 2022-11-10 NOTE — PROGRESS NOTES
Claude Olivares (: 1945) is a 78 y.o. female, established patient, here for evaluation of the following chief complaint(s): Ankle Pain (Right ankle fx sx 10/30)       ASSESSMENT/PLAN:  Below is the assessment and plan developed based on review of pertinent history, physical exam, labs, studies, and medications. Findings were discussed with the patient today. She Tracy weight-bear as tolerated in her boot for the right ankle. She may progress out of the boot for left ankle as she can tolerate. 1. Closed fracture of right ankle with routine healing, subsequent encounter  -     XR ANKLE RT MIN 3 V; Future  2. Moderate left ankle sprain, subsequent encounter      Return in about 4 weeks (around 2022). SUBJECTIVE/OBJECTIVE:  Claude Olivares (: 1945) is a 78 y.o. female. She presents today for follow-up of her right ankle ORIF. She also has a left ankle sprain. Allergies   Allergen Reactions    Sulfa (Sulfonamide Antibiotics) Rash       Current Outpatient Medications   Medication Sig    senna-docusate (PERICOLACE) 8.6-50 mg per tablet Take 2 Tablets by mouth two (2) times a day. rivaroxaban (Xarelto) 10 mg tablet Take 1 Tablet by mouth daily (with breakfast). For DVT prophylaxis after ankle fracture, stop after 1 month    promethazine (PHENERGAN) 12.5 mg tablet Take 1 Tab by mouth every six (6) hours as needed for Nausea. (Patient not taking: Reported on 10/30/2022)    naproxen sodium 220 mg cap Take  by mouth as needed. triamterene-hydrochlorothiazide (DYAZIDE) 37.5-25 mg per capsule Take 1 Cap by mouth daily. nadolol (CORGARD) 40 mg tablet Take 1 Tab by mouth daily. albuterol (PROVENTIL, VENTOLIN) 90 mcg/actuation inhaler Take 2 Puffs by inhalation every four (4) hours as needed. No current facility-administered medications for this visit.        Social History     Socioeconomic History    Marital status:      Spouse name: Anna Sat    Number of children: 3    Years of education: Not on file    Highest education level: Not on file   Occupational History    Occupation: retired     Employer: RETIRED   Tobacco Use    Smoking status: Never    Smokeless tobacco: Never   Substance and Sexual Activity    Alcohol use: Yes     Alcohol/week: 1.0 standard drink     Types: 1 Glasses of wine per week     Comment: rare/every 6 months    Drug use: No    Sexual activity: Yes     Partners: Male   Other Topics Concern    Not on file   Social History Narrative    Not on file     Social Determinants of Health     Financial Resource Strain: Not on file   Food Insecurity: Not on file   Transportation Needs: Not on file   Physical Activity: Not on file   Stress: Not on file   Social Connections: Not on file   Intimate Partner Violence: Not on file   Housing Stability: Not on file       Past Surgical History:   Procedure Laterality Date    COLONOSCOPY  2006    nml    EGD  2006    HX CHOLECYSTECTOMY  08/18/2016    Lap alber    HX DILATION AND CURETTAGE      HX PEPPER AND BSO      HX TONSILLECTOMY      HX TUBAL LIGATION      KRISHNA MAMMOGRAM SCREENING BILATERAL  7/30/12       Family History   Problem Relation Age of Onset    Cancer Mother 80        BREAST    Hypertension Mother     Breast Cancer Mother     Hypertension Father     Stroke Father     Cancer Sister 76        BREAST    Mult Sclerosis Sister     Breast Cancer Sister     Diabetes Other     Alcohol abuse Neg Hx     OSTEOARTHRITIS Neg Hx     Asthma Neg Hx     Bleeding Prob Neg Hx     Elevated Lipids Neg Hx     Headache Neg Hx     Heart Disease Neg Hx     Lung Disease Neg Hx     Migraines Neg Hx     Psychiatric Disorder Neg Hx     Mental Retardation Neg Hx         OB History    No obstetric history on file. REVIEW OF SYSTEMS:    Patient denies any recent fever, chills, nausea, vomiting, chest pain, or shortness of breath.       Vitals:  Ht 5' 3\" (1.6 m)   Wt 269 lb (122 kg)   LMP  (LMP Unknown)   BMI 47.65 kg/m² Body mass index is 47.65 kg/m². PHYSICAL EXAM:  General exam: Patient is awake, alert, and oriented x3. Well-appearing. No acute distress. She presents in wheelchair    Right ankle: Neurovascular and sensory intact. Mild swelling and ecchymosis. Incision is well-healed with no erythema or drainage. Left ankle: Neurovascular and sensory intact. There is tenderness to palpation at the lateral ankle just distal to the fibula. There is evidence of swelling and ecchymosis in this area. There is pain with calcaneal tilt testing. Mild tenderness to palpation at the medial ankle in the area of the deltoid ligament noted. No obvious instability on testing. Negative proximal squeeze test.  No tenderness to palpation at the fifth metatarsal.  Achilles tendon is palpable and intact. IMAGING:    XR Results (most recent):  Results from Appointment encounter on 11/10/22    XR ANKLE RT MIN 3 V    Narrative  X-rays of the right ankle 3 views done today show maintained joint space. Fracture is well aligned. No signs of hardware failure or loosening      Results from Hospital Encounter encounter on 10/29/22    XR ANKLE RT AP/LAT    Narrative  EXAM: XR ANKLE RT AP/LAT    INDICATION: fx right ankle. COMPARISON: Prior day. FINDINGS: Two views of the right ankle were obtained intraoperatively. Patient  is post plate and screw fixation of the distal fibular fracture with  reconstitution of the ankle mortise. Fracture of the posterior malleolus is  again noted and not significantly changed. .    Impression  1. Post ORIF of right ankle. XR CHEST PORT    Narrative  EXAM:  XR CHEST PORT    INDICATION: Preop evaluation. COMPARISON: Chest x-ray 8/18/2016. TECHNIQUE: Single portable chest AP view    FINDINGS: The cardiac silhouette is within normal limits. The pulmonary  vasculature is within normal limits. The lungs and pleural spaces are clear.  The visualized bones and upper abdomen  are age-appropriate. Impression  No acute process on portable chest.         Orders Placed This Encounter    XR ANKLE RT MIN 3 V     Standing Status:   Future     Number of Occurrences:   1     Standing Expiration Date:   11/11/2023              An electronic signature was used to authenticate this note.   -- Grace Monzon, DO

## 2022-11-10 NOTE — PATIENT INSTRUCTIONS
Date of appointment:  11/10/2022     Examining Physician: Becki Dietz        Name:  Bridgette Rothman                                          YOB: 1945                               Medical record number: [id number]      Visit information      Reason for visit: Status post ORIF right ankle, left ankle sprain    X-rays of right ankle look good today. Ankle fracture is healing well. Can progress out of the boot for the LEFT ankle when tolerable. Can weight-bear as tolerated on the RIGHT ankle IN the boot. Can remove the boot when in bed or when seated. Follow-up in 1 month. I do recommend taking Keflex 500 mg 3 times a day for 7 days as she does have some mild drainage from the right ankle wound. Continue to monitor this wound. Could consider Medihoney if needed. She also has some neuropathy type symptoms.   Could consider gabapentin          Signed: Hector Magallanes, DO

## 2022-12-16 ENCOUNTER — OFFICE VISIT (OUTPATIENT)
Dept: ORTHOPEDIC SURGERY | Age: 77
End: 2022-12-16
Payer: MEDICARE

## 2022-12-16 VITALS — WEIGHT: 269 LBS | HEIGHT: 63 IN | BODY MASS INDEX: 47.66 KG/M2

## 2022-12-16 DIAGNOSIS — S82.891D CLOSED FRACTURE OF RIGHT ANKLE WITH ROUTINE HEALING, SUBSEQUENT ENCOUNTER: Primary | ICD-10-CM

## 2022-12-16 RX ORDER — METHYLPREDNISOLONE 4 MG/1
TABLET ORAL
Qty: 1 DOSE PACK | Refills: 0 | Status: SHIPPED | OUTPATIENT
Start: 2022-12-16

## 2022-12-16 NOTE — PROGRESS NOTES
Octavia Green (: 1945) is a 78 y.o. female, established patient, here for evaluation of the following chief complaint(s):  Post OP Follow Up and Ankle Pain       ASSESSMENT/PLAN:  Below is the assessment and plan developed based on review of pertinent history, physical exam, labs, studies, and medications. She has a small wound at the lateral ankle that is not currently draining. Possibly due to the boot. If this worsens then she will call and we will send in some Medihoney. However, I recommended starting to weight-bear as tolerated and discontinue the boot for both ankles. I will plan to see her back in the next 6 weeks as needed. I will send in a Medrol Dosepak for her gout flare    1. Closed fracture of right ankle with routine healing, subsequent encounter  -     XR ANKLE RT MIN 3 V; Future      Return if symptoms worsen or fail to improve. SUBJECTIVE/OBJECTIVE:  Octavia Green (: 1945) is a 78 y.o. female. She presents today for follow-up of her right ankle ORIF and left ankle sprain. She notes that since she has been home from rehab she has gone from ambulating to being more sedentary. She believes she has had a gout flare recently. Allergies   Allergen Reactions    Sulfa (Sulfonamide Antibiotics) Rash       Current Outpatient Medications   Medication Sig    senna-docusate (PERICOLACE) 8.6-50 mg per tablet Take 2 Tablets by mouth two (2) times a day. rivaroxaban (Xarelto) 10 mg tablet Take 1 Tablet by mouth daily (with breakfast). For DVT prophylaxis after ankle fracture, stop after 1 month    promethazine (PHENERGAN) 12.5 mg tablet Take 1 Tab by mouth every six (6) hours as needed for Nausea. (Patient not taking: Reported on 10/30/2022)    naproxen sodium 220 mg cap Take  by mouth as needed. triamterene-hydrochlorothiazide (DYAZIDE) 37.5-25 mg per capsule Take 1 Cap by mouth daily. nadolol (CORGARD) 40 mg tablet Take 1 Tab by mouth daily.     albuterol (PROVENTIL, VENTOLIN) 90 mcg/actuation inhaler Take 2 Puffs by inhalation every four (4) hours as needed. No current facility-administered medications for this visit. Social History     Socioeconomic History    Marital status:      Spouse name: Luis Del Cid    Number of children: 3    Years of education: Not on file    Highest education level: Not on file   Occupational History    Occupation: retired     Employer: RETIRED   Tobacco Use    Smoking status: Never    Smokeless tobacco: Never   Substance and Sexual Activity    Alcohol use:  Yes     Alcohol/week: 1.0 standard drink     Types: 1 Glasses of wine per week     Comment: rare/every 6 months    Drug use: No    Sexual activity: Yes     Partners: Male   Other Topics Concern    Not on file   Social History Narrative    Not on file     Social Determinants of Health     Financial Resource Strain: Not on file   Food Insecurity: Not on file   Transportation Needs: Not on file   Physical Activity: Not on file   Stress: Not on file   Social Connections: Not on file   Intimate Partner Violence: Not on file   Housing Stability: Not on file       Past Surgical History:   Procedure Laterality Date    COLONOSCOPY  2006    nml    EGD  2006    HX CHOLECYSTECTOMY  08/18/2016    Lap alber    HX DILATION AND CURETTAGE      HX PEPPER AND BSO      HX TONSILLECTOMY      HX TUBAL LIGATION      KRISHNA MAMMOGRAM SCREENING BILATERAL  7/30/12       Family History   Problem Relation Age of Onset    Cancer Mother 80        BREAST    Hypertension Mother     Breast Cancer Mother     Hypertension Father     Stroke Father     Cancer Sister 76        BREAST    Mult Sclerosis Sister     Breast Cancer Sister     Diabetes Other     Alcohol abuse Neg Hx     OSTEOARTHRITIS Neg Hx     Asthma Neg Hx     Bleeding Prob Neg Hx     Elevated Lipids Neg Hx     Headache Neg Hx     Heart Disease Neg Hx     Lung Disease Neg Hx     Migraines Neg Hx     Psychiatric Disorder Neg Hx     Mental Retardation Neg Hx         OB History    No obstetric history on file. REVIEW OF SYSTEMS:  ROS    Positive for: Musculoskeletal  Last edited by Danay Del Valle on 12/16/2022  1:58 PM.        Patient denies any recent fever, chills, nausea, vomiting, chest pain, or shortness of breath. Vitals:  Ht 5' 3\" (1.6 m)   Wt 269 lb (122 kg)   LMP  (LMP Unknown)   BMI 47.65 kg/m²    Body mass index is 47.65 kg/m². PHYSICAL EXAM:  General exam: Patient is awake, alert, and oriented x3. Well-appearing. No acute distress. Ambulates with an antalgic gait. Heart/Lungs:  no respiratory distress, palpable pulses    Right ankle: Grossly neurovascular and sensory intact. No significant tenderness palpation about the ankle. Stiffness is noted with range of motion. There is some peeling of the dry skin. Small nondraining wound noted at the lateral ankle. No erythema or ecchymosis    IMAGING:    XR Results (most recent):  Results from Appointment encounter on 12/16/22    XR ANKLE RT MIN 3 V    Narrative  X-rays of the right ankle 3 views done today show evidence of normal overall alignment. No signs of acute fracture. There is evidence of joint space narrowing and osteophyte formation. Results from Appointment encounter on 11/10/22    XR ANKLE RT MIN 3 V    Narrative  X-rays of the right ankle 3 views done today show maintained joint space. Fracture is well aligned. No signs of hardware failure or loosening      Results from Hospital Encounter encounter on 10/29/22    XR ANKLE RT AP/LAT    Narrative  EXAM: XR ANKLE RT AP/LAT    INDICATION: fx right ankle. COMPARISON: Prior day. FINDINGS: Two views of the right ankle were obtained intraoperatively. Patient  is post plate and screw fixation of the distal fibular fracture with  reconstitution of the ankle mortise. Fracture of the posterior malleolus is  again noted and not significantly changed. .    Impression  1.  Post ORIF of right ankle.         Orders Placed This Encounter    XR ANKLE RT MIN 3 V     Standing Status:   Future     Number of Occurrences:   1     Standing Expiration Date:   3/16/2023              An electronic signature was used to authenticate this note.   -- Deana Cook, DO

## 2022-12-16 NOTE — LETTER
12/16/2022    Patient: Carlos Hanks   YOB: 1945   Date of Visit: 12/16/2022     Shelly Sargent [place]  [address] [address]  [address] [address]  Via Fax: [phone]    Dear Shelly Sargent MD,      Thank you for referring Ms. Willie Fuentes to [facility] for evaluation. My notes for this consultation are attached. If you have questions, please do not hesitate to call me. I look forward to following your patient along with you.       Sincerely,    Travis Treadwell, DO

## 2023-02-08 ENCOUNTER — OFFICE VISIT (OUTPATIENT)
Dept: INTERNAL MEDICINE CLINIC | Age: 78
End: 2023-02-08
Payer: MEDICARE

## 2023-02-08 VITALS
SYSTOLIC BLOOD PRESSURE: 135 MMHG | WEIGHT: 241.6 LBS | HEART RATE: 60 BPM | DIASTOLIC BLOOD PRESSURE: 84 MMHG | BODY MASS INDEX: 42.81 KG/M2 | RESPIRATION RATE: 20 BRPM | HEIGHT: 63 IN | TEMPERATURE: 97.6 F | OXYGEN SATURATION: 100 %

## 2023-02-08 DIAGNOSIS — M1A.09X0 IDIOPATHIC CHRONIC GOUT OF MULTIPLE SITES WITHOUT TOPHUS: ICD-10-CM

## 2023-02-08 DIAGNOSIS — I10 PRIMARY HYPERTENSION: ICD-10-CM

## 2023-02-08 DIAGNOSIS — R73.03 PRE-DIABETES: ICD-10-CM

## 2023-02-08 DIAGNOSIS — J45.40 MODERATE PERSISTENT ASTHMA WITHOUT COMPLICATION: ICD-10-CM

## 2023-02-08 DIAGNOSIS — L30.9 ECZEMA, UNSPECIFIED TYPE: ICD-10-CM

## 2023-02-08 DIAGNOSIS — Z76.89 ESTABLISHING CARE WITH NEW DOCTOR, ENCOUNTER FOR: Primary | ICD-10-CM

## 2023-02-08 DIAGNOSIS — E66.01 OBESITY, CLASS III, BMI 40-49.9 (MORBID OBESITY) (HCC): ICD-10-CM

## 2023-02-08 DIAGNOSIS — R20.2 PARESTHESIA: ICD-10-CM

## 2023-02-08 DIAGNOSIS — E78.2 MIXED HYPERLIPIDEMIA: ICD-10-CM

## 2023-02-08 PROCEDURE — 1090F PRES/ABSN URINE INCON ASSESS: CPT | Performed by: STUDENT IN AN ORGANIZED HEALTH CARE EDUCATION/TRAINING PROGRAM

## 2023-02-08 PROCEDURE — G8510 SCR DEP NEG, NO PLAN REQD: HCPCS | Performed by: STUDENT IN AN ORGANIZED HEALTH CARE EDUCATION/TRAINING PROGRAM

## 2023-02-08 PROCEDURE — 1101F PT FALLS ASSESS-DOCD LE1/YR: CPT | Performed by: STUDENT IN AN ORGANIZED HEALTH CARE EDUCATION/TRAINING PROGRAM

## 2023-02-08 PROCEDURE — G8427 DOCREV CUR MEDS BY ELIG CLIN: HCPCS | Performed by: STUDENT IN AN ORGANIZED HEALTH CARE EDUCATION/TRAINING PROGRAM

## 2023-02-08 PROCEDURE — G8536 NO DOC ELDER MAL SCRN: HCPCS | Performed by: STUDENT IN AN ORGANIZED HEALTH CARE EDUCATION/TRAINING PROGRAM

## 2023-02-08 PROCEDURE — 3017F COLORECTAL CA SCREEN DOC REV: CPT | Performed by: STUDENT IN AN ORGANIZED HEALTH CARE EDUCATION/TRAINING PROGRAM

## 2023-02-08 PROCEDURE — G8399 PT W/DXA RESULTS DOCUMENT: HCPCS | Performed by: STUDENT IN AN ORGANIZED HEALTH CARE EDUCATION/TRAINING PROGRAM

## 2023-02-08 PROCEDURE — 99204 OFFICE O/P NEW MOD 45 MIN: CPT | Performed by: STUDENT IN AN ORGANIZED HEALTH CARE EDUCATION/TRAINING PROGRAM

## 2023-02-08 PROCEDURE — G8417 CALC BMI ABV UP PARAM F/U: HCPCS | Performed by: STUDENT IN AN ORGANIZED HEALTH CARE EDUCATION/TRAINING PROGRAM

## 2023-02-08 RX ORDER — DEXTROMETHORPHAN HYDROBROMIDE, GUAIFENESIN 5; 100 MG/5ML; MG/5ML
650 LIQUID ORAL
COMMUNITY

## 2023-02-08 RX ORDER — TRIAMTERENE/HYDROCHLOROTHIAZID 37.5-25 MG
0.5 TABLET ORAL DAILY
COMMUNITY

## 2023-02-08 RX ORDER — ALLOPURINOL 100 MG/1
100 TABLET ORAL DAILY
COMMUNITY
Start: 2023-01-11

## 2023-02-08 RX ORDER — COLCHICINE 0.6 MG/1
0.6 TABLET ORAL DAILY
COMMUNITY
Start: 2023-01-10 | End: 2023-02-08 | Stop reason: ALTCHOICE

## 2023-02-08 RX ORDER — TRIAMCINOLONE ACETONIDE 1 MG/G
OINTMENT TOPICAL 2 TIMES DAILY
Qty: 30 G | Refills: 2 | Status: SHIPPED | OUTPATIENT
Start: 2023-02-08

## 2023-02-08 NOTE — ASSESSMENT & PLAN NOTE
Controlled on PRN albuterol. This may not be the case in the spring. I would like to see her back then and we can discuss ICS-LABA.

## 2023-02-08 NOTE — PROGRESS NOTES
Yinka Monique is a 78y.o. year old female who is a new patient to me today (02/08/23). She was previous followed by Dr Barrie Quintero, who retired. She saw a Dr Isi Infante in the same office but now she is ready to switch. Assessment & Plan:   1. Establishing care with new doctor, encounter for  2. Primary hypertension  Assessment & Plan:  Controlled, cont triamterene-HCTZ and nadolol. Encouraged her to keep a log of home BP readings. Orders:  -     METABOLIC PANEL, COMPREHENSIVE; Future  -     LIPID PANEL; Future  3. Moderate persistent asthma without complication  Assessment & Plan:  Controlled on PRN albuterol. This may not be the case in the spring. I would like to see her back then and we can discuss ICS-LABA. 4. Pre-diabetes  Assessment & Plan:  History per chart review. Check A1c today. Orders:  -     HEMOGLOBIN A1C WITH EAG; Future  5. Eczema, unspecified type  Comments:  patchs on BL arms. start triamcinolone. avoid hot showers and scented prodcuts. will refer to derm if refractory  Orders:  -     triamcinolone acetonide (KENALOG) 0.1 % ointment; Apply  to affected area two (2) times a day. use thin layer, Normal, Disp-30 g, R-2  6. Paresthesia  Comments:  R thigh. Favor meralgia paresthetica, but ddx includes lumbosacral radiculopathy. Weight loss advised. 7. Mixed hyperlipidemia  Assessment & Plan:  Check lipid panel to assess control. She is not on a statin. 8. Idiopathic chronic gout of multiple sites without tophus  Assessment & Plan:  She had a recent flare. She was started on allopurinol and colchicine for the flare. Continue allopurinol. Will check uric acid today. Orders:  -     URIC ACID; Future  9. Obesity, Class III, BMI 40-49.9 (morbid obesity) ([facility] [address]  Comments:  we will need to discuss weight loss strategies at future visits. she has a very hard time with mobility since breaking her ankle.        Health Maintenance - defer  Flu vaccine:  COVID vaccine:  Tetanus vaccine:  Shingles vaccine:  Pneumonia vaccine:  Cervical cancer screening:  Breast cancer screening:  Colon cancer screening:  DEXA:  Lung cancer screening:  Hep C:  Lipid:  DM: Healthcare decision maker: daughter  ACP: needs     RTC: 3 mo    Subjective:   Misti Carter was seen today for Establish Care    She is the sister of 2 of my other patients, Carlos Alberto Escobar and [employer]. HTN  - triamterene-HCTZ, nadolol  - she has home health coming and they report her BP is good. Asthma  - symptoms are very rare, less than once a month. It is worse in the summer with pollen. - she has an albuterol inhaler, can't quantify how often she uses. She has nocturnal awakenings twice a month. - she has taken a steroid inhaler in the past when she has a cough    HLD  - she denies this being an active problem    Pre-DM  - on problem list  - she denies this being an active problem    Gout  - has a flare in October  - she is taking allopurinol daily and colchicine PRN flare. R ankle fracture   -10/2022  - she is still getting PT    RENEA  - she reports being tested twice and the test was inconclusive. Last test was years ago. R Thigh pain - having numbness for at least 6 months. Eczema - has patches on her arms. She has been using OTC topical steroids but it is not working. She has been seen by dermatologists in the past, none currently. Review of Systems   All other systems reviewed and are negative. PMHx    Patient Active Problem List   Diagnosis Code    Primary hypertension I10    Asthma J45.909    Osteoarthritis M19.90    Hyperlipidemia E78.5    Pre-diabetes R73.03    Family history of colon cancer Z80.0    Closed right ankle fracture S82.891A    Idiopathic chronic gout of multiple sites without tophus M1A. 09X0    Eczema, unspecified type L30.9       Past Medical History:   Diagnosis Date    Arthritis     bursitis    Asthma     Chronic pain     feet,hips    Family history of colon cancer 02/12/2015 mother    GERD (gastroesophageal reflux disease)     H/O bone density study 07/30/2012    nml    Hyperlipidemia 05/08/2012    Hypertension     Sleep apnea     no cpap       Prior to Admission medications    Medication Sig Start Date End Date Taking? Authorizing Provider   acetaminophen (TYLENOL) 650 mg TbER Take 650 mg by mouth daily as needed. Yes Provider, Historical   allopurinoL (ZYLOPRIM) 100 mg tablet Take 100 mg by mouth daily. 1/11/23  Yes Provider, Historical   triamterene-hydroCHLOROthiazide (MAXZIDE) 37.5-25 mg per tablet Take 0.5 Tablets by mouth daily. Yes Provider, Historical   triamcinolone acetonide (KENALOG) 0.1 % ointment Apply  to affected area two (2) times a day. use thin layer 2/8/23  Yes Riddhi Zavala MD   senna-docusate (PERICOLACE) 8.6-50 mg per tablet Take 2 Tablets by mouth two (2) times a day. Patient taking differently: Take 2 Tablets by mouth two (2) times daily as needed. 11/2/22  Yes Shobha Sharpe MD   nadolol (CORGARD) 40 mg tablet Take 1 Tab by mouth daily. 7/2/13  Yes Dotti Aschoff, MD   albuterol (PROVENTIL, VENTOLIN) 90 mcg/actuation inhaler Take 2 Puffs by inhalation every four (4) hours as needed. 5/14/13  Yes Dotti Aschoff, MD   colchicine 0.6 mg tablet Take 0.6 mg by mouth daily. PRN gout flare ups. 1/10/23 2/8/23  Provider, Historical   methylPREDNISolone (Medrol, Mio,) 4 mg tablet Use as directed 12/16/22 2/8/23  Martina MARTINEZ,    rivaroxaban (Xarelto) 10 mg tablet Take 1 Tablet by mouth daily (with breakfast). For DVT prophylaxis after ankle fracture, stop after 1 month  Patient not taking: Reported on 2/8/2023 11/2/22 2/8/23  Shobha Sharpe MD   promethazine (PHENERGAN) 12.5 mg tablet Take 1 Tab by mouth every six (6) hours as needed for Nausea. Patient not taking: Reported on 10/30/2022 8/18/16 2/8/23  Dionna VILLALTA MD   naproxen sodium 220 mg cap Take  by mouth as needed.   2/8/23  Provider, Historical   triamterene-hydrochlorothiazide (DYAZIDE) 37.5-25 mg per capsule Take 1 Cap by mouth daily. 7/2/13 2/8/23  Everardo Fong MD       PSHx    Past Surgical History:   Procedure Laterality Date    COLONOSCOPY  2006    nml    EGD  2006    HX ANKLE FRACTURE TX Right 10/2022    HX CHOLECYSTECTOMY  08/18/2016    Lap alber    HX DILATION AND CURETTAGE      HX PEPPER AND BSO      HX TONSILLECTOMY      HX TUBAL LIGATION      KRISHNA MAMMOGRAM SCREENING BILATERAL  07/30/2012       FH    Family History   Problem Relation Age of Onset    Hypertension Mother     Breast Cancer Mother     Colon Cancer Mother         possibly, not sure    Hypertension Father     Stroke Father     Mult Sclerosis Sister     Breast Cancer Sister 61    Diabetes Other     Alcohol abuse Neg Hx     OSTEOARTHRITIS Neg Hx     Asthma Neg Hx     Bleeding Prob Neg Hx     Elevated Lipids Neg Hx     Headache Neg Hx     Heart Disease Neg Hx     Lung Disease Neg Hx     Migraines Neg Hx         SH    Social History     Occupational History    Occupation: retired     Employer: RETIRED   Tobacco Use    Smoking status: Never    Smokeless tobacco: Never   Substance and Sexual Activity    Alcohol use: Yes     Alcohol/week: 1.0 standard drink     Types: 1 Glasses of wine per week     Comment: rare/every 6 months    Drug use: No    Sexual activity: Yes     Partners: Male        The following sections were reviewed & updated as appropriate: Problem List, Allergies, PMH, PSH, FH, and SH. Objective:   Visit Vitals  /84   Pulse 60   Temp 97.6 °F (36.4 °C) (Temporal)   Resp 20   Ht 5' 3\" (1.6 m)   Wt 241 lb 9.6 oz (109.6 kg)   LMP  (LMP Unknown)   SpO2 100%   BMI 42.80 kg/m²       Physical Exam  Constitutional:       Appearance: She is obese. Eyes:      Extraocular Movements: Extraocular movements intact. Cardiovascular:      Rate and Rhythm: Normal rate and regular rhythm. Heart sounds: No murmur heard. Pulmonary:      Effort: Pulmonary effort is normal. No respiratory distress.    Abdominal: General: Bowel sounds are normal.      Palpations: Abdomen is soft. Musculoskeletal:      Right lower leg: No edema. Left lower leg: No edema. Neurological:      General: No focal deficit present. Mental Status: She is alert.    Psychiatric:         Mood and Affect: Mood normal.         Behavior: Behavior normal.            Liberty Wilhelm MD

## 2023-02-08 NOTE — PATIENT INSTRUCTIONS
Please check blood pressure once a week at home and write it down in a log. Bring this to your visit. Please double check your mediations at home with the ones on this list. If there are any errors please let me know. When you need refills please let me know.

## 2023-02-08 NOTE — ASSESSMENT & PLAN NOTE
She had a recent flare. She was started on allopurinol and colchicine for the flare. Continue allopurinol. Will check uric acid today.

## 2023-06-02 RX ORDER — ALLOPURINOL 100 MG/1
100 TABLET ORAL DAILY
Qty: 90 TABLET | Refills: 1 | Status: SHIPPED | OUTPATIENT
Start: 2023-06-02

## 2023-06-02 NOTE — TELEPHONE ENCOUNTER
Medication Refill Request      triamcinolone (KENALOG) 0.1 % ointment    allopurinol (ZYLOPRIM) 100 MG tablet       [facility] - Mady Jamisonnohemiarm[address] [phone] - d [phone] (Pharmacy)

## 2023-06-06 ENCOUNTER — OFFICE VISIT (OUTPATIENT)
Age: 78
End: 2023-06-06
Payer: MEDICARE

## 2023-06-06 VITALS
HEIGHT: 63 IN | SYSTOLIC BLOOD PRESSURE: 135 MMHG | DIASTOLIC BLOOD PRESSURE: 81 MMHG | RESPIRATION RATE: 20 BRPM | BODY MASS INDEX: 44.44 KG/M2 | HEART RATE: 55 BPM | TEMPERATURE: 98.4 F | WEIGHT: 250.8 LBS | OXYGEN SATURATION: 100 %

## 2023-06-06 DIAGNOSIS — E78.2 MIXED HYPERLIPIDEMIA: ICD-10-CM

## 2023-06-06 DIAGNOSIS — M1A.09X0 IDIOPATHIC CHRONIC GOUT OF MULTIPLE SITES WITHOUT TOPHUS: Primary | ICD-10-CM

## 2023-06-06 DIAGNOSIS — Z12.31 ENCOUNTER FOR SCREENING MAMMOGRAM FOR MALIGNANT NEOPLASM OF BREAST: ICD-10-CM

## 2023-06-06 DIAGNOSIS — Z78.0 MENOPAUSE: ICD-10-CM

## 2023-06-06 DIAGNOSIS — I10 PRIMARY HYPERTENSION: ICD-10-CM

## 2023-06-06 DIAGNOSIS — R60.0 LEG EDEMA: ICD-10-CM

## 2023-06-06 DIAGNOSIS — S82.891D CLOSED FRACTURE OF RIGHT ANKLE WITH ROUTINE HEALING, SUBSEQUENT ENCOUNTER: ICD-10-CM

## 2023-06-06 PROCEDURE — 99214 OFFICE O/P EST MOD 30 MIN: CPT | Performed by: STUDENT IN AN ORGANIZED HEALTH CARE EDUCATION/TRAINING PROGRAM

## 2023-06-06 PROCEDURE — G8399 PT W/DXA RESULTS DOCUMENT: HCPCS | Performed by: STUDENT IN AN ORGANIZED HEALTH CARE EDUCATION/TRAINING PROGRAM

## 2023-06-06 PROCEDURE — 3075F SYST BP GE 130 - 139MM HG: CPT | Performed by: STUDENT IN AN ORGANIZED HEALTH CARE EDUCATION/TRAINING PROGRAM

## 2023-06-06 PROCEDURE — G8417 CALC BMI ABV UP PARAM F/U: HCPCS | Performed by: STUDENT IN AN ORGANIZED HEALTH CARE EDUCATION/TRAINING PROGRAM

## 2023-06-06 PROCEDURE — 1123F ACP DISCUSS/DSCN MKR DOCD: CPT | Performed by: STUDENT IN AN ORGANIZED HEALTH CARE EDUCATION/TRAINING PROGRAM

## 2023-06-06 PROCEDURE — G8427 DOCREV CUR MEDS BY ELIG CLIN: HCPCS | Performed by: STUDENT IN AN ORGANIZED HEALTH CARE EDUCATION/TRAINING PROGRAM

## 2023-06-06 PROCEDURE — 1090F PRES/ABSN URINE INCON ASSESS: CPT | Performed by: STUDENT IN AN ORGANIZED HEALTH CARE EDUCATION/TRAINING PROGRAM

## 2023-06-06 PROCEDURE — 3079F DIAST BP 80-89 MM HG: CPT | Performed by: STUDENT IN AN ORGANIZED HEALTH CARE EDUCATION/TRAINING PROGRAM

## 2023-06-06 PROCEDURE — 1036F TOBACCO NON-USER: CPT | Performed by: STUDENT IN AN ORGANIZED HEALTH CARE EDUCATION/TRAINING PROGRAM

## 2023-06-06 PROCEDURE — 3017F COLORECTAL CA SCREEN DOC REV: CPT | Performed by: STUDENT IN AN ORGANIZED HEALTH CARE EDUCATION/TRAINING PROGRAM

## 2023-06-06 RX ORDER — FUROSEMIDE 20 MG/1
20 TABLET ORAL DAILY
Qty: 60 TABLET | Refills: 3 | Status: SHIPPED | OUTPATIENT
Start: 2023-06-06 | End: 2023-06-06 | Stop reason: SDUPTHER

## 2023-06-06 RX ORDER — FUROSEMIDE 20 MG/1
20 TABLET ORAL DAILY
Qty: 30 TABLET | Refills: 3 | Status: SHIPPED | OUTPATIENT
Start: 2023-06-06

## 2023-06-06 RX ORDER — FUROSEMIDE 20 MG/1
20 TABLET ORAL DAILY
Qty: 30 TABLET | Refills: 3 | Status: SHIPPED | OUTPATIENT
Start: 2023-06-06 | End: 2023-06-06 | Stop reason: SDUPTHER

## 2023-06-06 SDOH — ECONOMIC STABILITY: HOUSING INSECURITY
IN THE LAST 12 MONTHS, WAS THERE A TIME WHEN YOU DID NOT HAVE A STEADY PLACE TO SLEEP OR SLEPT IN A SHELTER (INCLUDING NOW)?: NO

## 2023-06-06 SDOH — ECONOMIC STABILITY: FOOD INSECURITY: WITHIN THE PAST 12 MONTHS, YOU WORRIED THAT YOUR FOOD WOULD RUN OUT BEFORE YOU GOT MONEY TO BUY MORE.: NEVER TRUE

## 2023-06-06 SDOH — ECONOMIC STABILITY: INCOME INSECURITY: HOW HARD IS IT FOR YOU TO PAY FOR THE VERY BASICS LIKE FOOD, HOUSING, MEDICAL CARE, AND HEATING?: NOT HARD AT ALL

## 2023-06-06 SDOH — ECONOMIC STABILITY: FOOD INSECURITY: WITHIN THE PAST 12 MONTHS, THE FOOD YOU BOUGHT JUST DIDN'T LAST AND YOU DIDN'T HAVE MONEY TO GET MORE.: NEVER TRUE

## 2023-06-06 ASSESSMENT — PATIENT HEALTH QUESTIONNAIRE - PHQ9
SUM OF ALL RESPONSES TO PHQ QUESTIONS 1-9: 0
2. FEELING DOWN, DEPRESSED OR HOPELESS: 0
1. LITTLE INTEREST OR PLEASURE IN DOING THINGS: 0
SUM OF ALL RESPONSES TO PHQ QUESTIONS 1-9: 0
SUM OF ALL RESPONSES TO PHQ9 QUESTIONS 1 & 2: 0

## 2023-06-06 NOTE — ASSESSMENT & PLAN NOTE
Controlled on atenolol and low dose maxide. HCTZ may have precipitated a gout flare, she is concerned about this. Will stop Maxide and replace with lasix. This will help with her leg swelling. Will monitor BP on new therapy, may need to add losartan if running high.

## 2023-06-06 NOTE — ASSESSMENT & PLAN NOTE
She is still having pain. Advised she return to orthopedics for re-eval.   She requests referral to outpatient PT since her home health PT ended.

## 2023-06-06 NOTE — PATIENT INSTRUCTIONS
Please obtain the following vaccines from your local pharmacy. Please ask your pharmacy to fax our office a copy of the vaccination record.  Our fax number is [phone].   - shingles vaccine - shingrix - 2 doses   - tetanus booster  - COVID booster - now or in the fall   - Pneumonia vaccine - Prevnar 20

## 2023-06-06 NOTE — PROGRESS NOTES
Rodney Muñoz is a 78y.o. year old female who presents today (06/06/23) for follow-up. Assessment & Plan:   1. Idiopathic chronic gout of multiple sites without tophus  Assessment & Plan:  Check uric acid after being on allopurinol 200mg dose. She is not sure is she still has gout in her ankle because she is still having pain. She will discuss aspiration with ortho as this will be the only way to tell but it is a difficult joint to aspirate. Orders:  -     Uric Acid; Future  2. Closed fracture of right ankle with routine healing, subsequent encounter  Assessment & Plan:  She is still having pain. Advised she return to orthopedics for re-eval.   She requests referral to outpatient PT since her home health PT ended. Orders:  -     [facility]  3. Leg edema  Assessment & Plan:  Update TTE to screen for HF. Start lasix 20mg daily. Check labs in 2 weeks. Orders:  -     Basic Metabolic Panel; Future  -     Transthoracic echocardiogram (TTE) complete with contrast, bubble, strain, and 3D PRN; Future  -     furosemide (LASIX) 20 MG tablet; Take 1 tablet by mouth daily, Disp-30 tablet, R-3Normal  4. Primary hypertension  Assessment & Plan:  Controlled on atenolol and low dose maxide. HCTZ may have precipitated a gout flare, she is concerned about this. Will stop Maxide and replace with lasix. This will help with her leg swelling. Will monitor BP on new therapy, may need to add losartan if running high. 5. Encounter for screening mammogram for malignant neoplasm of breast  -     MAURICE DIGITAL SCREEN W OR WO CAD BILATERAL; Future  6. Menopause  -     DEXA BONE DENSITY AXIAL SKELETON; Future  7. Mixed hyperlipidemia  Assessment & Plan:  Discussed ASCVD risk score and modifiable factors. She is not interested in starting cholesterol lowering therapy at this time. I think this is reasonable, her LDL is 120s.          Health Maintenance   Flu vaccine:  COVID vaccine:

## 2023-06-06 NOTE — ASSESSMENT & PLAN NOTE
Discussed ASCVD risk score and modifiable factors. She is not interested in starting cholesterol lowering therapy at this time. I think this is reasonable, her LDL is 120s.

## 2023-06-28 ENCOUNTER — HOSPITAL ENCOUNTER (OUTPATIENT)
Facility: HOSPITAL | Age: 78
Discharge: HOME | End: 2023-07-01
Attending: STUDENT IN AN ORGANIZED HEALTH CARE EDUCATION/TRAINING PROGRAM
Payer: MEDICARE

## 2023-06-28 DIAGNOSIS — R60.0 LEG EDEMA: ICD-10-CM

## 2023-06-28 DIAGNOSIS — Z78.0 MENOPAUSE: ICD-10-CM

## 2023-06-28 DIAGNOSIS — M1A.09X0 IDIOPATHIC CHRONIC GOUT OF MULTIPLE SITES WITHOUT TOPHUS: ICD-10-CM

## 2023-06-28 DIAGNOSIS — Z12.31 ENCOUNTER FOR SCREENING MAMMOGRAM FOR MALIGNANT NEOPLASM OF BREAST: ICD-10-CM

## 2023-06-28 PROCEDURE — 77080 DXA BONE DENSITY AXIAL: CPT

## 2023-06-28 PROCEDURE — 77067 SCR MAMMO BI INCL CAD: CPT

## 2023-06-29 LAB
ANION GAP SERPL CALC-SCNC: 2 MMOL/L (ref 5–15)
BUN SERPL-MCNC: 12 MG/DL (ref 6–20)
BUN/CREAT SERPL: 16 (ref 12–20)
CALCIUM SERPL-MCNC: 9.7 MG/DL (ref 8.5–10.1)
CHLORIDE SERPL-SCNC: 107 MMOL/L (ref 97–108)
CO2 SERPL-SCNC: 34 MMOL/L (ref 21–32)
CREAT SERPL-MCNC: 0.75 MG/DL (ref 0.55–1.02)
GLUCOSE SERPL-MCNC: 89 MG/DL (ref 65–100)
POTASSIUM SERPL-SCNC: 3.5 MMOL/L (ref 3.5–5.1)
SODIUM SERPL-SCNC: 143 MMOL/L (ref 136–145)
URATE SERPL-MCNC: 5.6 MG/DL (ref 2.6–6)

## 2023-07-13 ENCOUNTER — TELEPHONE (OUTPATIENT)
Age: 78
End: 2023-07-13

## 2023-07-13 DIAGNOSIS — R60.0 LEG EDEMA: ICD-10-CM

## 2023-07-13 RX ORDER — NADOLOL 40 MG/1
40 TABLET ORAL DAILY
Qty: 90 TABLET | Refills: 1 | Status: SHIPPED | OUTPATIENT
Start: 2023-07-13

## 2023-07-13 RX ORDER — FUROSEMIDE 20 MG/1
20 TABLET ORAL DAILY
Qty: 30 TABLET | Refills: 2 | Status: SHIPPED | OUTPATIENT
Start: 2023-07-13

## 2023-07-13 NOTE — TELEPHONE ENCOUNTER
Medication Refill Request    Lis Judge is requesting a refill of the following medication(s):   nadolol (CORGARD) 40 MG tablet  furosemide (LASIX) 20 MG tablet          Please send refill to:      [id number] [facility] [phone] - R [phone] (Pharmacy)

## 2023-07-13 NOTE — TELEPHONE ENCOUNTER
Spoke with pt to let her know we received request from [address] [place] [address] for refill on her Lasix and Nadolol. Her Lasix was filled on 6/6/23 for #30/3RF. She states she has this med. She wanted it to go to [place] as it may be short term. Asked if she was still taking Nadolol? She is and was last filled on 3/21/23 by her prior PCP.  Will forward to MD.

## 2023-08-08 ENCOUNTER — HOSPITAL ENCOUNTER (OUTPATIENT)
Facility: HOSPITAL | Age: 78
Discharge: HOME | End: 2023-08-10
Attending: STUDENT IN AN ORGANIZED HEALTH CARE EDUCATION/TRAINING PROGRAM
Payer: MEDICARE

## 2023-08-08 VITALS
SYSTOLIC BLOOD PRESSURE: 135 MMHG | DIASTOLIC BLOOD PRESSURE: 81 MMHG | BODY MASS INDEX: 44.14 KG/M2 | HEIGHT: 63 IN | WEIGHT: 249.12 LBS

## 2023-08-08 DIAGNOSIS — R60.0 LEG EDEMA: ICD-10-CM

## 2023-08-08 PROCEDURE — 93306 TTE W/DOPPLER COMPLETE: CPT

## 2023-08-09 LAB
ECHO AO ROOT DIAM: 3.3 CM
ECHO AO ROOT INDEX: 1.56 CM/M2
ECHO AV PEAK GRADIENT: 6 MMHG
ECHO AV PEAK VELOCITY: 1.3 M/S
ECHO AV VELOCITY RATIO: 0.77
ECHO BSA: 2.24 M2
ECHO LA DIAMETER INDEX: 1.46 CM/M2
ECHO LA DIAMETER: 3.1 CM
ECHO LA TO AORTIC ROOT RATIO: 0.94
ECHO LA VOL 2C: 37 ML (ref 22–52)
ECHO LA VOL 2C: 38 ML (ref 22–52)
ECHO LA VOL 4C: 39 ML (ref 22–52)
ECHO LA VOL 4C: 41 ML (ref 22–52)
ECHO LA VOL BP: 38 ML (ref 22–52)
ECHO LA VOL/BSA BIPLANE: 18 ML/M2 (ref 16–34)
ECHO LA VOLUME AREA LENGTH: 40 ML
ECHO LA VOLUME INDEX AREA LENGTH: 19 ML/M2 (ref 16–34)
ECHO LV E' SEPTAL VELOCITY: 7 CM/S
ECHO LV FRACTIONAL SHORTENING: 36 % (ref 28–44)
ECHO LV INTERNAL DIMENSION DIASTOLE INDEX: 1.98 CM/M2
ECHO LV INTERNAL DIMENSION DIASTOLIC: 4.2 CM (ref 3.9–5.3)
ECHO LV INTERNAL DIMENSION SYSTOLIC INDEX: 1.27 CM/M2
ECHO LV INTERNAL DIMENSION SYSTOLIC: 2.7 CM
ECHO LV IVSD: 0.8 CM (ref 0.6–0.9)
ECHO LV MASS 2D: 85.1 G (ref 67–162)
ECHO LV MASS INDEX 2D: 40.1 G/M2 (ref 43–95)
ECHO LV POSTERIOR WALL DIASTOLIC: 0.6 CM (ref 0.6–0.9)
ECHO LV RELATIVE WALL THICKNESS RATIO: 0.29
ECHO LVOT PEAK GRADIENT: 4 MMHG
ECHO LVOT PEAK VELOCITY: 1 M/S
ECHO MV A VELOCITY: 0.68 M/S
ECHO MV AREA PHT: 2.8 CM2
ECHO MV E DECELERATION TIME (DT): 273.5 MS
ECHO MV E VELOCITY: 0.73 M/S
ECHO MV E/A RATIO: 1.07
ECHO MV E/E' SEPTAL: 10.43
ECHO MV PRESSURE HALF TIME (PHT): 79.3 MS
ECHO PV MAX VELOCITY: 0.8 M/S
ECHO PV PEAK GRADIENT: 2 MMHG
ECHO RV TAPSE: 2.1 CM (ref 1.7–?)

## 2023-09-14 ENCOUNTER — TELEMEDICINE (OUTPATIENT)
Age: 78
End: 2023-09-14
Payer: MEDICARE

## 2023-09-14 DIAGNOSIS — U07.1 COVID-19 VIRUS INFECTION: Primary | ICD-10-CM

## 2023-09-14 DIAGNOSIS — I10 PRIMARY HYPERTENSION: ICD-10-CM

## 2023-09-14 DIAGNOSIS — M1A.09X0 IDIOPATHIC CHRONIC GOUT OF MULTIPLE SITES WITHOUT TOPHUS: ICD-10-CM

## 2023-09-14 DIAGNOSIS — J45.20 MILD INTERMITTENT ASTHMA, UNSPECIFIED WHETHER COMPLICATED: ICD-10-CM

## 2023-09-14 DIAGNOSIS — R60.0 LEG EDEMA: ICD-10-CM

## 2023-09-14 PROCEDURE — 99214 OFFICE O/P EST MOD 30 MIN: CPT | Performed by: STUDENT IN AN ORGANIZED HEALTH CARE EDUCATION/TRAINING PROGRAM

## 2023-09-14 PROCEDURE — 1123F ACP DISCUSS/DSCN MKR DOCD: CPT | Performed by: STUDENT IN AN ORGANIZED HEALTH CARE EDUCATION/TRAINING PROGRAM

## 2023-09-14 PROCEDURE — G8427 DOCREV CUR MEDS BY ELIG CLIN: HCPCS | Performed by: STUDENT IN AN ORGANIZED HEALTH CARE EDUCATION/TRAINING PROGRAM

## 2023-09-14 PROCEDURE — G8399 PT W/DXA RESULTS DOCUMENT: HCPCS | Performed by: STUDENT IN AN ORGANIZED HEALTH CARE EDUCATION/TRAINING PROGRAM

## 2023-09-14 PROCEDURE — 1090F PRES/ABSN URINE INCON ASSESS: CPT | Performed by: STUDENT IN AN ORGANIZED HEALTH CARE EDUCATION/TRAINING PROGRAM

## 2023-09-14 RX ORDER — ALLOPURINOL 100 MG/1
200 TABLET ORAL DAILY
Qty: 180 TABLET | Refills: 0
Start: 2023-09-14

## 2023-09-14 RX ORDER — COLCHICINE 0.6 MG/1
0.6 TABLET ORAL DAILY
COMMUNITY

## 2023-09-14 NOTE — ASSESSMENT & PLAN NOTE
Reports gout flare in her R mid-foot that began last night. She has colchicine left at home from her old MD. Maria Elena Griffin to take 1.2mg today and then 0.6mg 1 hour later, then 0.6mg BID until flare resolves (about a week). She will contact me for refills.

## 2023-09-14 NOTE — PROGRESS NOTES
Jocelynn Zamora is a 70 y.o. female who was seen by synchronous (real-time) audio-video technology on 9/14/2023. Assessment & Plan:   Below is the assessment and plan developed based on review of pertinent history, physical exam (if applicable), labs, studies, and medications. 1. COVID-19 virus infection  Comments:  took Paxlovid from her 's doc and reports rebound. discussed quarantine guidelines related to this. sx mgmt with guaifenesin, dextromethorpahn, albutero   2. Mild intermittent asthma, unspecified whether complicated  Assessment & Plan:  A little worse right now because of COVID. She typically does well with PRN albuterol. Currently using BID. Advised to contact me if frequency of albuterol use increases. 3. Idiopathic chronic gout of multiple sites without tophus  Assessment & Plan:  Reports gout flare in her R mid-foot that began last night. She has colchicine left at home from her old MDRicarda Griffin to take 1.2mg today and then 0.6mg 1 hour later, then 0.6mg BID until flare resolves (about a week). She will contact me for refills if she runs out before flare resolved. 4. Primary hypertension  Assessment & Plan:  She reports BP is controlled on nadolol and lasix, continue    5. Leg edema  Assessment & Plan:  Improved on lasix, continue. Check renal function and electrolytes next visit. RTC: 1/2023 medicare annual wellness      Subjective:   Jocelynn Zamora was seen for Cough and Congestion    COVID -  came down on 9/5 and she started feeling bad on 9/6. Her 's doctor sent her some Paxlovid. This helped her recover. She developed a productive cough after she stopped. Cough is worse at night. She is taking nyquil, this helps with the cough. Using albuterol twice a day. Leg edema   - started on lasix 20mg daily in June. - TTE normal EF and diastolic function. HTN  - nadolol, lasix  - home BP - 2 days ago BP was 133/80    Gout - allopurinol.  Started

## 2023-09-14 NOTE — ASSESSMENT & PLAN NOTE
A little worse right now because of COVID. She typically does well with PRN albuterol. Currently using BID. Advised to contact me if frequency of albuterol use increases.

## 2023-11-03 ENCOUNTER — OFFICE VISIT (OUTPATIENT)
Age: 78
End: 2023-11-03
Payer: MEDICARE

## 2023-11-03 VITALS
OXYGEN SATURATION: 98 % | HEIGHT: 63 IN | BODY MASS INDEX: 43.55 KG/M2 | SYSTOLIC BLOOD PRESSURE: 178 MMHG | HEART RATE: 60 BPM | DIASTOLIC BLOOD PRESSURE: 88 MMHG | TEMPERATURE: 98.4 F | WEIGHT: 245.8 LBS | RESPIRATION RATE: 16 BRPM

## 2023-11-03 DIAGNOSIS — S16.1XXA STRAIN OF NECK MUSCLE, INITIAL ENCOUNTER: ICD-10-CM

## 2023-11-03 DIAGNOSIS — I10 PRIMARY HYPERTENSION: Primary | ICD-10-CM

## 2023-11-03 PROCEDURE — G8399 PT W/DXA RESULTS DOCUMENT: HCPCS | Performed by: NURSE PRACTITIONER

## 2023-11-03 PROCEDURE — 1036F TOBACCO NON-USER: CPT | Performed by: NURSE PRACTITIONER

## 2023-11-03 PROCEDURE — 1123F ACP DISCUSS/DSCN MKR DOCD: CPT | Performed by: NURSE PRACTITIONER

## 2023-11-03 PROCEDURE — 3079F DIAST BP 80-89 MM HG: CPT | Performed by: NURSE PRACTITIONER

## 2023-11-03 PROCEDURE — G8417 CALC BMI ABV UP PARAM F/U: HCPCS | Performed by: NURSE PRACTITIONER

## 2023-11-03 PROCEDURE — 99213 OFFICE O/P EST LOW 20 MIN: CPT | Performed by: NURSE PRACTITIONER

## 2023-11-03 PROCEDURE — 1090F PRES/ABSN URINE INCON ASSESS: CPT | Performed by: NURSE PRACTITIONER

## 2023-11-03 PROCEDURE — 3077F SYST BP >= 140 MM HG: CPT | Performed by: NURSE PRACTITIONER

## 2023-11-03 PROCEDURE — G8484 FLU IMMUNIZE NO ADMIN: HCPCS | Performed by: NURSE PRACTITIONER

## 2023-11-03 PROCEDURE — G8427 DOCREV CUR MEDS BY ELIG CLIN: HCPCS | Performed by: NURSE PRACTITIONER

## 2023-11-03 RX ORDER — AMLODIPINE BESYLATE 5 MG/1
5 TABLET ORAL DAILY
Qty: 90 TABLET | Refills: 0 | Status: SHIPPED | OUTPATIENT
Start: 2023-11-03

## 2023-11-24 NOTE — ED PROVIDER NOTES
EMERGENCY DEPARTMENT HISTORY AND PHYSICAL EXAM      Date: 10/29/2022  Patient Name: Zander Marrero    History of Presenting Illness     Chief Complaint   Patient presents with    Foot Pain     Pain at right foot pain for 1 week, pt given a boot last week but has not been wearing. States both feet hurt at this time    Shortness of Breath     EMS called for SOB; pt denies any at this time. Fatigue     Weakness and fatigue reported. Poor appetite and intake for 1 week. EMS reports pt has not been OOB       History Provided By: Patient and Patient's Daughter    HPI: Zander Marrero, 78 y.o. female presents to the ED with cc of shortness of breath. 16-year-old female with a history of asthma and CHF on Lasix presents emergency department with shortness of breath. Patient reports an episode of shortness of breath that began this afternoon. Patient reports symptoms were associated with \"shallow breathing\" and central chest pain. They are mild to moderate intensity and lasted approximately 1 hour and then resolved. Patient reports that she does not have chest pain anymore. Patient had a fall 6 days ago, seen at outside hospital, diagnosed with right fibular fracture, placed in a walking boot, patient remove this after 2 days due to discomfort with it. She reports continued pain in right ankle and has essentially been bedbound. She has not been bearing weight on it. Patient also reports interval development of left ankle pain. This began 2 days ago. Is located along the lateral ankle and worse with movement and improved with nothing. There are no other complaints, changes, or physical findings at this time. PCP: Tildon Angelucci, MD    No current facility-administered medications on file prior to encounter.      Current Outpatient Medications on File Prior to Encounter   Medication Sig Dispense Refill    oxyCODONE-acetaminophen (PERCOCET) 5-325 mg per tablet Take 1-2 Tabs by mouth every four (4) hours as needed for Pain. Max Daily Amount: 12 Tabs. 50 Tab 0    promethazine (PHENERGAN) 12.5 mg tablet Take 1 Tab by mouth every six (6) hours as needed for Nausea. 20 Tab 0    naproxen sodium (ALEVE) 220 mg cap Take  by mouth as needed. triamterene-hydrochlorothiazide (DYAZIDE) 37.5-25 mg per capsule Take 1 Cap by mouth daily. 30 Cap 5    nadolol (CORGARD) 40 mg tablet Take 1 Tab by mouth daily. 30 Tab 5    albuterol (PROVENTIL, VENTOLIN) 90 mcg/actuation inhaler Take 2 Puffs by inhalation every four (4) hours as needed. 10 g 1       Past History     Past Medical History:  Past Medical History:   Diagnosis Date    Arthritis     bursitis    Asthma     Chronic pain     feet,hips    Family history of colon cancer 2/12/2015    mother    GERD (gastroesophageal reflux disease)     H/O bone density study 7/30/12    nml    Hyperlipidemia 5/8/2012    Hypertension     Sleep apnea     no cpap       Past Surgical History:  Past Surgical History:   Procedure Laterality Date    COLONOSCOPY  2006    nml    EGD  2006    HX CHOLECYSTECTOMY  08/18/2016    Lap alber    HX DILATION AND CURETTAGE      HX PEPPER AND BSO      HX TONSILLECTOMY      HX TUBAL LIGATION      KRISHNA MAMMOGRAM SCREENING BILATERAL  7/30/12       Family History:  Family History   Problem Relation Age of Onset    Cancer Mother 80        BREAST    Hypertension Mother     Breast Cancer Mother     Hypertension Father     Stroke Father     Cancer Sister 76        BREAST    Mult Sclerosis Sister     Breast Cancer Sister     Diabetes Other     Alcohol abuse Neg Hx     OSTEOARTHRITIS Neg Hx     Asthma Neg Hx     Bleeding Prob Neg Hx     Elevated Lipids Neg Hx     Headache Neg Hx     Heart Disease Neg Hx     Lung Disease Neg Hx     Migraines Neg Hx     Psychiatric Disorder Neg Hx     Mental Retardation Neg Hx        Social History:  Social History     Tobacco Use    Smoking status: Never    Smokeless tobacco: Never   Substance Use Topics    Alcohol use:  Yes Alcohol/week: 1.0 standard drink     Types: 1 Glasses of wine per week     Comment: rare/every 6 months    Drug use: No       Allergies: Allergies   Allergen Reactions    Sulfa (Sulfonamide Antibiotics) Rash         Review of Systems   Review of Systems   Constitutional:  Negative for activity change, chills and fever. HENT:  Negative for facial swelling and voice change. Eyes:  Negative for redness. Respiratory:  Positive for shortness of breath. Negative for cough and wheezing. Cardiovascular:  Positive for chest pain and leg swelling. Gastrointestinal:  Negative for abdominal pain, diarrhea, nausea and vomiting. Genitourinary:  Negative for decreased urine volume. Musculoskeletal:  Positive for arthralgias and joint swelling. Negative for gait problem. Skin:  Negative for pallor and rash. Neurological:  Negative for tremors and facial asymmetry. Psychiatric/Behavioral:  Negative for agitation. All other systems reviewed and are negative. Physical Exam   Physical Exam  Vitals and nursing note reviewed. Constitutional:       Comments: 51-year-old female, resting in bed, no acute distress, appears chronically ill   HENT:      Head: Normocephalic and atraumatic. Cardiovascular:      Rate and Rhythm: Normal rate and regular rhythm. Heart sounds: No murmur heard. No friction rub. No gallop. Pulmonary:      Effort: Pulmonary effort is normal.      Breath sounds: Wheezing present. No rhonchi or rales. Abdominal:      Palpations: Abdomen is soft. Tenderness: There is no abdominal tenderness. Musculoskeletal:         General: Normal range of motion. Cervical back: Normal range of motion. Right lower leg: Edema present. Left lower leg: Edema present. Comments: The right ankle is significantly swollen. It is tender to palpation. 1+ DP and PT pulses. No wound. Left ankle with no significant swelling.   Mild tenderness to palpation along left lateral malleolus. 2+ DP and PT pulses. Skin:     General: Skin is warm. Capillary Refill: Capillary refill takes less than 2 seconds. Neurological:      General: No focal deficit present. Mental Status: She is alert. Psychiatric:         Mood and Affect: Mood normal.       Diagnostic Study Results     Labs -     Recent Results (from the past 12 hour(s))   CBC WITH AUTOMATED DIFF    Collection Time: 10/29/22  8:43 PM   Result Value Ref Range    WBC 8.5 3.6 - 11.0 K/uL    RBC 4.04 3.80 - 5.20 M/uL    HGB 11.3 (L) 11.5 - 16.0 g/dL    HCT 35.7 35.0 - 47.0 %    MCV 88.4 80.0 - 99.0 FL    MCH 28.0 26.0 - 34.0 PG    MCHC 31.7 30.0 - 36.5 g/dL    RDW 13.1 11.5 - 14.5 %    PLATELET 836 880 - 101 K/uL    MPV 11.7 8.9 - 12.9 FL    NRBC 0.0 0  WBC    ABSOLUTE NRBC 0.00 0.00 - 0.01 K/uL    NEUTROPHILS 80 (H) 32 - 75 %    LYMPHOCYTES 12 12 - 49 %    MONOCYTES 7 5 - 13 %    EOSINOPHILS 0 0 - 7 %    BASOPHILS 1 0 - 1 %    IMMATURE GRANULOCYTES 0 0.0 - 0.5 %    ABS. NEUTROPHILS 6.8 1.8 - 8.0 K/UL    ABS. LYMPHOCYTES 1.0 0.8 - 3.5 K/UL    ABS. MONOCYTES 0.6 0.0 - 1.0 K/UL    ABS. EOSINOPHILS 0.0 0.0 - 0.4 K/UL    ABS. BASOPHILS 0.0 0.0 - 0.1 K/UL    ABS. IMM. GRANS. 0.0 0.00 - 0.04 K/UL    DF AUTOMATED     METABOLIC PANEL, COMPREHENSIVE    Collection Time: 10/29/22  8:43 PM   Result Value Ref Range    Sodium 140 136 - 145 mmol/L    Potassium 3.1 (L) 3.5 - 5.1 mmol/L    Chloride 98 97 - 108 mmol/L    CO2 34 (H) 21 - 32 mmol/L    Anion gap 8 5 - 15 mmol/L    Glucose 117 (H) 65 - 100 mg/dL    BUN 13 6 - 20 MG/DL    Creatinine 0.80 0.55 - 1.02 MG/DL    BUN/Creatinine ratio 16 12 - 20      eGFR >60 >60 ml/min/1.73m2    Calcium 10.0 8.5 - 10.1 MG/DL    Bilirubin, total 0.9 0.2 - 1.0 MG/DL    ALT (SGPT) 29 12 - 78 U/L    AST (SGOT) 34 15 - 37 U/L    Alk.  phosphatase 84 45 - 117 U/L    Protein, total 7.7 6.4 - 8.2 g/dL    Albumin 2.7 (L) 3.5 - 5.0 g/dL    Globulin 5.0 (H) 2.0 - 4.0 g/dL    A-G Ratio 0.5 (L) 1.1 - 2.2 TROPONIN-HIGH SENSITIVITY    Collection Time: 10/29/22  8:43 PM   Result Value Ref Range    Troponin-High Sensitivity 11 0 - 51 ng/L   NT-PRO BNP    Collection Time: 10/29/22  8:43 PM   Result Value Ref Range    NT pro- <450 PG/ML   EKG, 12 LEAD, INITIAL    Collection Time: 10/29/22  8:54 PM   Result Value Ref Range    Ventricular Rate 68 BPM    Atrial Rate 68 BPM    P-R Interval 134 ms    QRS Duration 70 ms    Q-T Interval 412 ms    QTC Calculation (Bezet) 438 ms    Calculated P Axis 48 degrees    Calculated R Axis 15 degrees    Calculated T Axis -84 degrees    Diagnosis       Normal sinus rhythm  ST & T wave abnormality, consider inferior ischemia  ST & T wave abnormality, consider anterolateral ischemia  When compared with ECG of 16-AUG-2016 11:07,  Inverted T waves have replaced nonspecific T wave abnormality in Anterior   leads         Radiologic Studies -   XR ANKLE RT MIN 3 V   Final Result   Lateral malleolus fracture with widened medial mortise   redemonstrated in fiberglass splint. XR ANKLE RT MIN 3 V   Final Result   Fracture dislocation. XR ANKLE LT MIN 3 V   Final Result   No acute abnormality. CTA CHEST W OR W WO CONT    (Results Pending)   XR CHEST PORT    (Results Pending)     CT Results  (Last 48 hours)      None          CXR Results  (Last 48 hours)      None            Medical Decision Making   I am the first provider for this patient. I reviewed the vital signs, available nursing notes, past medical history, past surgical history, family history and social history. Vital Signs-Reviewed the patient's vital signs.   Patient Vitals for the past 12 hrs:   Temp Pulse Resp BP SpO2   10/29/22 2152 -- 70 -- (!) 152/93 100 %   10/29/22 2052 -- 69 -- (!) 146/105 100 %   10/29/22 2022 -- 64 -- 122/73 97 %   10/29/22 2007 -- 74 -- (!) 190/77 99 %   10/29/22 1952 -- 70 -- (!) 180/80 --   10/29/22 1937 -- 74 -- (!) 173/78 99 %   10/29/22 1922 98.9 °F (37.2 °C) 76 19 (!) 149/95 98 % Records Reviewed: Nursing Notes and Old Medical Records    Provider Notes (Medical Decision Making):     70-year-old female presents emerged from with a known right fibula fracture. Has been essentially bedbound since then. Presents the emergency department by EMS with a chief complaint of shortness of breath, now resolved. Will check cardiac work-up. Exclude PE given immobilization. Will medicate for pain. We will repeat a plain film with patient's fibula fracture and image left ankle to exclude underlying fracture. Will medicate for pain. Disposition pending ability of patient to care for herself. ED Course:   Initial assessment performed. The patients presenting problems have been discussed, and they are in agreement with the care plan formulated and outlined with them. I have encouraged them to ask questions as they arise throughout their visit. ED Course as of 10/30/22 0029   Sat Oct 29, 2022   2058 EKG interpreted by me. Shows NSR with a HR of 68. No ST elevations. T-wave inversions in precordial leads. [MB]   3198 Labs reviewed, mild anemia, mild hypokalemia. Troponin non-specifically elevated. .  Plain film of the left ankle was unremarkable. Plain film of the right ankle redemonstrates the known fibular fracture with mortise widening on my review. With gentle traction this was placed in a splint in a more anatomic position. Patient discussed with Ortho, they recommend hospitalist admission for preoperative clearance. Elevation and splint as above. CTA ordered to exclude PE and pending. Discussed with hospitalist. [MB]   Sun Oct 30, 2022   0028 Plain film of the ankle showed better alignment, discussed this with Ortho. Patient awaiting CT. Patient to be admitted pending CTA to rule out PE. Patient's chest pain has resolved.  [MB]      ED Course User Index  [MB] Marisol Molian MD         PROCEDURES    Procedure Note - Splint Placement:  11:10 PM  Performed by: myself  Neurovascularly intact prior to tx. An Orthoglass sugar tong splint was placed on pt's right tibia, ankle. Joint was placed in neutral position. Neurovascularly intact after tx. The procedure took 1-15 minutes, and pt tolerated moderately. Mamta Mccarthy MD      Disposition:    Admitted      Diagnosis     Clinical Impression:   1. Closed fracture of distal end of right fibula, unspecified fracture morphology, initial encounter    2. Chest pain, unspecified type    3. SOB (shortness of breath)    4. Elevated blood pressure reading        Attestations:    Mamta Mccarthy MD        Please note that this dictation was completed with [employer], the "[email or web address] voice recognition software. Quite often unanticipated grammatical, syntax, homophones, and other interpretive errors are inadvertently transcribed by the computer software. Please disregard these errors. Please excuse any errors that have escaped final proofreading. Thank you. 24-Nov-2023 14:21

## 2023-12-06 ENCOUNTER — OFFICE VISIT (OUTPATIENT)
Age: 78
End: 2023-12-06
Payer: MEDICARE

## 2023-12-06 ENCOUNTER — TELEPHONE (OUTPATIENT)
Age: 78
End: 2023-12-06

## 2023-12-06 VITALS
TEMPERATURE: 97.4 F | BODY MASS INDEX: 45.71 KG/M2 | OXYGEN SATURATION: 100 % | DIASTOLIC BLOOD PRESSURE: 93 MMHG | WEIGHT: 248.4 LBS | HEART RATE: 56 BPM | SYSTOLIC BLOOD PRESSURE: 177 MMHG | RESPIRATION RATE: 20 BRPM | HEIGHT: 62 IN

## 2023-12-06 DIAGNOSIS — I10 PRIMARY HYPERTENSION: ICD-10-CM

## 2023-12-06 DIAGNOSIS — G47.30 SLEEP APNEA, UNSPECIFIED TYPE: ICD-10-CM

## 2023-12-06 DIAGNOSIS — Z01.818 PRE-OP EXAM: Primary | ICD-10-CM

## 2023-12-06 PROCEDURE — 1123F ACP DISCUSS/DSCN MKR DOCD: CPT | Performed by: STUDENT IN AN ORGANIZED HEALTH CARE EDUCATION/TRAINING PROGRAM

## 2023-12-06 PROCEDURE — 1090F PRES/ABSN URINE INCON ASSESS: CPT | Performed by: STUDENT IN AN ORGANIZED HEALTH CARE EDUCATION/TRAINING PROGRAM

## 2023-12-06 PROCEDURE — G8417 CALC BMI ABV UP PARAM F/U: HCPCS | Performed by: STUDENT IN AN ORGANIZED HEALTH CARE EDUCATION/TRAINING PROGRAM

## 2023-12-06 PROCEDURE — 3079F DIAST BP 80-89 MM HG: CPT | Performed by: STUDENT IN AN ORGANIZED HEALTH CARE EDUCATION/TRAINING PROGRAM

## 2023-12-06 PROCEDURE — G8427 DOCREV CUR MEDS BY ELIG CLIN: HCPCS | Performed by: STUDENT IN AN ORGANIZED HEALTH CARE EDUCATION/TRAINING PROGRAM

## 2023-12-06 PROCEDURE — G8399 PT W/DXA RESULTS DOCUMENT: HCPCS | Performed by: STUDENT IN AN ORGANIZED HEALTH CARE EDUCATION/TRAINING PROGRAM

## 2023-12-06 PROCEDURE — 1036F TOBACCO NON-USER: CPT | Performed by: STUDENT IN AN ORGANIZED HEALTH CARE EDUCATION/TRAINING PROGRAM

## 2023-12-06 PROCEDURE — 99214 OFFICE O/P EST MOD 30 MIN: CPT | Performed by: STUDENT IN AN ORGANIZED HEALTH CARE EDUCATION/TRAINING PROGRAM

## 2023-12-06 PROCEDURE — 3077F SYST BP >= 140 MM HG: CPT | Performed by: STUDENT IN AN ORGANIZED HEALTH CARE EDUCATION/TRAINING PROGRAM

## 2023-12-06 PROCEDURE — G8484 FLU IMMUNIZE NO ADMIN: HCPCS | Performed by: STUDENT IN AN ORGANIZED HEALTH CARE EDUCATION/TRAINING PROGRAM

## 2023-12-06 RX ORDER — ERYTHROMYCIN 5 MG/G
OINTMENT OPHTHALMIC NIGHTLY
COMMUNITY
Start: 2023-10-26

## 2023-12-06 RX ORDER — OLMESARTAN MEDOXOMIL 20 MG/1
20 TABLET ORAL DAILY
Qty: 90 TABLET | Refills: 1 | Status: SHIPPED | OUTPATIENT
Start: 2023-12-06

## 2023-12-06 NOTE — PATIENT INSTRUCTIONS
Check blood pressure at home daily. Come back for nurse BP check next Wednesday. Bring blood pressure log.

## 2023-12-06 NOTE — PROGRESS NOTES
Preoperative Evaluation:   DEANNA STRAUSS is 70 y.o. female (1945) who presents for preoperative evaluation. She is hypertensive in the office today. She has been taking nadolol, lasix, and amlodipine 5mg. She usually takes them in the afternoon. She doesn't find the increased urination after taking the medication to be an issue. Procedure/Surgery: Cataract L eye    Date of Procedure/Surgery: 12/20/2023  Surgeon: Patricia Heaton MD  Hospital/Surgical Facility: [address]  Primary Physician: Alex Kessler MD     Reason for surgery: Cataract    Pre-Operative Risk Assessment Using 2014 ACC/AHA Guidelines   Emergent procedure: No  Active Cardiac Condition including decompensated HF, Arrhythmia, MI <3 weeks, severe valve disease: No  Risk Level of Procedure: Low Risk (endoscopy, superficial skin, breast, ambulatory, or cataract, etc.)  Revised Cardiac Risk Index Risk factors: None  Measurement of Exercise Tolerance before Surgery is >4 METS (climbing > 1 flight of stairs without stopping, walking up hill > 1-2 blocks, scrubbing floors, moving furniture, golf, bowling, dancing or tennis, or running short distance): Unknown - she does not exercise, uses riding cart at grocery,  cleans the house      Hx RAYMOND: Yes - Untreated   Hx respiratory disease or smoking: Yes - Mild Asthma   Latex Allergy: No  Recent use of: No recent use of aspirin (ASA), NSAIDS or steroids  Anesthesia Complications: Yes: Personal Hx - she was slow to wake up from anesthesia when she had a hysterectomy and a tubal ligation done at a different time. No issues when gall bladder was removed 5 years ago. History of abnormal bleeding : None    Medication Recommendations: NONE         Prior to Admission medications    Medication Sig Start Date End Date Taking?  Authorizing Provider   erythromycin [facility]) 5 MG/GM ophthalmic ointment Place into both eyes nightly 1 application into the lower eyelid of

## 2024-01-22 ENCOUNTER — OFFICE VISIT (OUTPATIENT)
Age: 79
End: 2024-01-22
Payer: MEDICARE

## 2024-01-22 VITALS
SYSTOLIC BLOOD PRESSURE: 166 MMHG | HEIGHT: 62 IN | WEIGHT: 249.6 LBS | DIASTOLIC BLOOD PRESSURE: 83 MMHG | HEART RATE: 53 BPM | OXYGEN SATURATION: 97 % | TEMPERATURE: 98 F | RESPIRATION RATE: 20 BRPM | BODY MASS INDEX: 45.93 KG/M2

## 2024-01-22 DIAGNOSIS — Z91.81 AT HIGH RISK FOR FALLS: ICD-10-CM

## 2024-01-22 DIAGNOSIS — I10 PRIMARY HYPERTENSION: ICD-10-CM

## 2024-01-22 DIAGNOSIS — Z00.00 MEDICARE ANNUAL WELLNESS VISIT, SUBSEQUENT: Primary | ICD-10-CM

## 2024-01-22 DIAGNOSIS — R53.81 PHYSICAL DECONDITIONING: ICD-10-CM

## 2024-01-22 DIAGNOSIS — M79.89 LEG SWELLING: ICD-10-CM

## 2024-01-22 PROCEDURE — 99213 OFFICE O/P EST LOW 20 MIN: CPT | Performed by: STUDENT IN AN ORGANIZED HEALTH CARE EDUCATION/TRAINING PROGRAM

## 2024-01-22 PROCEDURE — G8484 FLU IMMUNIZE NO ADMIN: HCPCS | Performed by: STUDENT IN AN ORGANIZED HEALTH CARE EDUCATION/TRAINING PROGRAM

## 2024-01-22 PROCEDURE — G8399 PT W/DXA RESULTS DOCUMENT: HCPCS | Performed by: STUDENT IN AN ORGANIZED HEALTH CARE EDUCATION/TRAINING PROGRAM

## 2024-01-22 PROCEDURE — 1123F ACP DISCUSS/DSCN MKR DOCD: CPT | Performed by: STUDENT IN AN ORGANIZED HEALTH CARE EDUCATION/TRAINING PROGRAM

## 2024-01-22 PROCEDURE — G0439 PPPS, SUBSEQ VISIT: HCPCS | Performed by: STUDENT IN AN ORGANIZED HEALTH CARE EDUCATION/TRAINING PROGRAM

## 2024-01-22 PROCEDURE — G8417 CALC BMI ABV UP PARAM F/U: HCPCS | Performed by: STUDENT IN AN ORGANIZED HEALTH CARE EDUCATION/TRAINING PROGRAM

## 2024-01-22 PROCEDURE — 3077F SYST BP >= 140 MM HG: CPT | Performed by: STUDENT IN AN ORGANIZED HEALTH CARE EDUCATION/TRAINING PROGRAM

## 2024-01-22 PROCEDURE — 1036F TOBACCO NON-USER: CPT | Performed by: STUDENT IN AN ORGANIZED HEALTH CARE EDUCATION/TRAINING PROGRAM

## 2024-01-22 PROCEDURE — G8427 DOCREV CUR MEDS BY ELIG CLIN: HCPCS | Performed by: STUDENT IN AN ORGANIZED HEALTH CARE EDUCATION/TRAINING PROGRAM

## 2024-01-22 PROCEDURE — 1090F PRES/ABSN URINE INCON ASSESS: CPT | Performed by: STUDENT IN AN ORGANIZED HEALTH CARE EDUCATION/TRAINING PROGRAM

## 2024-01-22 PROCEDURE — 3079F DIAST BP 80-89 MM HG: CPT | Performed by: STUDENT IN AN ORGANIZED HEALTH CARE EDUCATION/TRAINING PROGRAM

## 2024-01-22 ASSESSMENT — PATIENT HEALTH QUESTIONNAIRE - PHQ9
SUM OF ALL RESPONSES TO PHQ9 QUESTIONS 1 & 2: 0
SUM OF ALL RESPONSES TO PHQ QUESTIONS 1-9: 0
2. FEELING DOWN, DEPRESSED OR HOPELESS: 0
1. LITTLE INTEREST OR PLEASURE IN DOING THINGS: 0
SUM OF ALL RESPONSES TO PHQ QUESTIONS 1-9: 0

## 2024-01-22 ASSESSMENT — LIFESTYLE VARIABLES
HOW OFTEN DO YOU HAVE A DRINK CONTAINING ALCOHOL: MONTHLY OR LESS
HOW MANY STANDARD DRINKS CONTAINING ALCOHOL DO YOU HAVE ON A TYPICAL DAY: 1 OR 2

## 2024-01-22 NOTE — ASSESSMENT & PLAN NOTE
Uncontrolled. She admits to missing medication intermittently and she is not sure if she is taking everything that is prescribed. She recently started using a pill box. Daughter will review med list from [facility] with mom at home. Goal BP <145/90. She will take medication as prescribed and continue to check blood pressure at home. No med changes right now - cont nadolol, amlodipine, olmesartan, lasix. Re-eval in 1 week.

## 2024-01-22 NOTE — PATIENT INSTRUCTIONS
Please obtain the following vaccines from your local pharmacy. Please ask your pharmacy to fax our office a copy of the vaccination record. Our fax number is [phone].   - flu vaccine  - COVID booster  - RSV vaccine  - Pneumonia vaccine - prevnar 20   - tetanus booster  - shingles vaccine     [facility]     Preventing Falls: Care Instructions  Injuries and health problems such as trouble walking or poor eyesight can increase your risk of falling. So can some medicines. But there are things you can do to help prevent falls. You can exercise to get stronger. You can also arrange your home to make it safer.    Talk to your doctor about the medicines you take. Ask if any of them increase the risk of falls and whether they can be changed or stopped.   Try to exercise regularly. It can help improve your strength and balance. This can help lower your risk of falling.     Practice fall safety and prevention.    Wear low-heeled shoes that fit well and give your feet good support. Talk to your doctor if you have foot problems that make this hard.  Carry a cellphone or wear a medical alert device that you can use to call for help.  Use stepladders instead of chairs to reach high objects. Don't climb if you're at risk for falls. Ask for help, if needed.  Wear the correct eyeglasses, if you need them.    Make your home safer.    Remove rugs, cords, clutter, and furniture from walkways.  Keep your house well lit. Use night-lights in hallways and bathrooms.  Install and use sturdy handrails on stairways.  Wear nonskid footwear, even inside. Don't walk barefoot or in socks without shoes.    Be safe outside.    Use handrails, curb cuts, and ramps whenever possible.  Keep your hands free by using a shoulder bag or backpack.  Try to walk in well-lit areas. Watch out for uneven ground, changes in pavement, and debris.  Be careful in the winter. Walk on the grass or gravel when sidewalks are slippery. Use de-icer on steps and

## 2024-01-22 NOTE — PROGRESS NOTES
Called  [facility] - Dr Harpreet Beasley to get another pre-op form for pt's upcoming surgery. Spoke with Mariana - [facility] DIA said she is not cleared for surgery this Wednesday due to high BP, will need to be reschedule.  She transferred me to [place] - surgery scheduler - left detailed message as above. Our fax # [phone]. Questions? Our office number [phone].  
processed food            Dentist Screen:  Have you seen the dentist within the past year?: (!) No    Intervention:  Advised to schedule with their dentist    Hearing Screen:  Do you or your family notice any trouble with your hearing that hasn't been managed with hearing aids?: (!) Yes    Interventions:  Patient comments: trouble hearing on the phone    Vision Screen:  Do you have difficulty driving, watching TV, or doing any of your daily activities because of your eyesight?: (!) Yes  Have you had an eye exam within the past year?: Yes  No results found.    Interventions:   Patient comments: planning for cataract surgery     Safety:  Do you have any tripping hazards - loose or unsecured carpets or rugs?: (!) Yes  Interventions:  Patient comments: area rugs     ADL's:   Patient reports needing help with:  Select all that apply: (!) Dressing, Walking/Balance  Select all that apply: (!) Transportation, Banking/Finances, Shopping  Interventions:  Patient comments:  helps her get dressed (tie shoes, put on stockings). Walks with a cane.  drives her around. She was forgetting to pay the bills (thought it was on auto draft) so family is double checking her.      Advanced Directives:  Do you have a Living Will?: (!) No    Intervention:               Flu vaccine: plans to get vaccine   COVID vaccine: plans to get vaccine  RSV: recommended   Tetanus vaccine: due recommended  Shingles vaccine: due recommended   Pneumonia vaccine: due for PCV 20 recommended   Breast cancer screenin2023  Colon cancer screenin - she will call [facility] to determine if she needs repeat   DEXA: 2023 normal  Hep C:  Lipid: 2023  DM: 2023 5.3%  Healthcare decision maker:  CHRIS however she wants this to be daughter  ACP: will refer         Objective   Vitals:    24 1151   BP: (!) 166/83   Site: Right Upper Arm   Position: Sitting   Cuff Size: Thigh   Pulse: 53   Resp: 20   Temp: 98 °F (36.7 °C)   SpO2: 97%

## 2024-01-23 ENCOUNTER — CLINICAL DOCUMENTATION (OUTPATIENT)
Dept: SPIRITUAL SERVICES | Age: 79
End: 2024-01-23

## 2024-01-23 DIAGNOSIS — I10 PRIMARY HYPERTENSION: Primary | ICD-10-CM

## 2024-01-23 NOTE — ACP (ADVANCE CARE PLANNING)
Advance Care Planning   Ambulatory ACP Specialist Patient Outreach    Date:  1/23/2024    ACP Specialist:  Madina Mills    Outreach call to patient in follow-up to ACP Specialist referral from: Anna Kline MD    [x] PCP  [] Provider   [] Ambulatory Care Management [] Other     For:                  [x] Advance Directive Assistance              [] Complete Portable DNR order              [] Complete POST/POLST/MOST              [] Code Status Discussion             [] Discuss Goals of Care             [] Early ACP Decision-Making              [] Other (Specify)    Date Referral Received: 1/22/2024    Next Step:   [x] ACP scheduled conversation  [] Outreach again in one week               [x] Email / Mail ACP Info Sheets  [x] Email / Mail Advance Directive   [] Closing referral.  Routing closure to referring provider/staff and to ACP Specialist .    [] Closure letter mailed to patient with invitation to contact ACP Specialist if / when ready.   [] Other (Specify here):         [x] At this time, Healthcare Decision Maker Is:    Advance Care Planning   Healthcare Decision Maker:    Primary Decision Maker: Adeline Dean - Child - [phone]      [] Primary agent named in scanned advance directive.    [x] Legal Next of Kin.     [] Unable to determine legal decision maker at this time.       Outreaches:       [x] 1st -  Date:  1/23/2024               Intervention:  [x] Spoke with Patient   [] Left Voice mail [] Email / Mail    [] Socogamehart  [] Other (Specify) :     Outcomes:  Writer attempted ACP outreach to patient's home number listed on file. Patient is agreeable to a conversation with ACP Specialist Sudheer Lara on Friday, 2/2/2024 at 3:00 PM.  A copy of [facility] and ACP information sheets mailed to patient's confirmed home address on file with ACP Specialist and Coordinator's contact information included.         Thank you for this referral.

## 2024-01-23 NOTE — PROGRESS NOTES
Please notify patient that lab could not process her blood sample from yesterday and she will need to have it re-drawn. I have placed the order in the system again. I apologize for the inconvenience, I am not sure what specifically when wrong at the lab. Can have it done on 2/2/24 when she comes back for follow-up

## 2024-01-24 NOTE — PROGRESS NOTES
Notified patient that lab could not process her blood sample and she will need to have it re-drawn. She states they were unable to draw. She is returning today to try again. Will forward to MD.

## 2024-02-02 ENCOUNTER — CLINICAL DOCUMENTATION (OUTPATIENT)
Dept: CASE MANAGEMENT | Age: 79
End: 2024-02-02

## 2024-02-02 ENCOUNTER — OFFICE VISIT (OUTPATIENT)
Age: 79
End: 2024-02-02
Payer: MEDICARE

## 2024-02-02 VITALS
TEMPERATURE: 97.5 F | SYSTOLIC BLOOD PRESSURE: 178 MMHG | RESPIRATION RATE: 16 BRPM | HEART RATE: 69 BPM | WEIGHT: 251.6 LBS | DIASTOLIC BLOOD PRESSURE: 88 MMHG | OXYGEN SATURATION: 100 % | BODY MASS INDEX: 46.3 KG/M2 | HEIGHT: 62 IN

## 2024-02-02 DIAGNOSIS — I10 PRIMARY HYPERTENSION: Primary | ICD-10-CM

## 2024-02-02 DIAGNOSIS — I10 PRIMARY HYPERTENSION: ICD-10-CM

## 2024-02-02 PROCEDURE — 1090F PRES/ABSN URINE INCON ASSESS: CPT | Performed by: NURSE PRACTITIONER

## 2024-02-02 PROCEDURE — G8427 DOCREV CUR MEDS BY ELIG CLIN: HCPCS | Performed by: NURSE PRACTITIONER

## 2024-02-02 PROCEDURE — 3079F DIAST BP 80-89 MM HG: CPT | Performed by: NURSE PRACTITIONER

## 2024-02-02 PROCEDURE — G8417 CALC BMI ABV UP PARAM F/U: HCPCS | Performed by: NURSE PRACTITIONER

## 2024-02-02 PROCEDURE — 1036F TOBACCO NON-USER: CPT | Performed by: NURSE PRACTITIONER

## 2024-02-02 PROCEDURE — 3077F SYST BP >= 140 MM HG: CPT | Performed by: NURSE PRACTITIONER

## 2024-02-02 PROCEDURE — G8484 FLU IMMUNIZE NO ADMIN: HCPCS | Performed by: NURSE PRACTITIONER

## 2024-02-02 PROCEDURE — 1123F ACP DISCUSS/DSCN MKR DOCD: CPT | Performed by: NURSE PRACTITIONER

## 2024-02-02 PROCEDURE — 99213 OFFICE O/P EST LOW 20 MIN: CPT | Performed by: NURSE PRACTITIONER

## 2024-02-02 PROCEDURE — G8399 PT W/DXA RESULTS DOCUMENT: HCPCS | Performed by: NURSE PRACTITIONER

## 2024-02-02 RX ORDER — OLMESARTAN MEDOXOMIL 40 MG/1
40 TABLET ORAL DAILY
Qty: 90 TABLET | Refills: 1 | Status: SHIPPED | OUTPATIENT
Start: 2024-02-02

## 2024-02-02 ASSESSMENT — PATIENT HEALTH QUESTIONNAIRE - PHQ9
2. FEELING DOWN, DEPRESSED OR HOPELESS: 0
SUM OF ALL RESPONSES TO PHQ9 QUESTIONS 1 & 2: 0
SUM OF ALL RESPONSES TO PHQ QUESTIONS 1-9: 0
SUM OF ALL RESPONSES TO PHQ QUESTIONS 1-9: 0
1. LITTLE INTEREST OR PLEASURE IN DOING THINGS: 0
SUM OF ALL RESPONSES TO PHQ QUESTIONS 1-9: 0
SUM OF ALL RESPONSES TO PHQ QUESTIONS 1-9: 0

## 2024-02-02 NOTE — ACP (ADVANCE CARE PLANNING)
Advance Care Planning   Ambulatory ACP Specialist Patient Outreach    Date:  2/2/2024    ACP Specialist:  Trice Lara LCSW    Outreach call to patient in follow-up to ACP Specialist referral from:Anna Kline MD     [x] PCP  [] Provider   [] Ambulatory Care Management [] Other      For:                  [x] Advance Directive Assistance              [] Complete Portable DNR order              [] Complete POST/POLST/MOST              [] Code Status Discussion             [] Discuss Goals of Care             [] Early ACP Decision-Making              [] Other (Specify)     Date Referral Received: 1/22/2024    Next Step:   [] ACP scheduled conversation  [x] Outreach again in one week               [] Email / Mail ACP Info Sheets  [] Email / Mail Advance Directive   [] Closing referral.  Routing closure to referring provider/staff and to ACP Specialist .    [] Closure letter mailed to patient with invitation to contact ACP Specialist if / when ready.   [] Other (Specify here):         [x] At this time, Healthcare Decision Maker Is:        [] Primary agent named in scanned advance directive.    [x] Legal Next of Kin.     [] Unable to determine legal decision maker at this time.    Outreaches:        [x]  Additional Outreach -  Date:  2/2/2024   (Specify Dates & special circumstances): Voicemail message left for patient    Outcomes: ACP Specialist made telephone call to both patient's home number ([phone]) and mobile number ([phone]) listed in the medical record for scheduled Advance Care Planning appointment.  There was no answer on either number and a voicemail message was left on patient's mobile number asking for her to return the telephone call if she would like to obtain a rescheduled Advance Care Planning appointment.  ACP Specialist will attempt to reach patient again next week to try to offer a rescheduled ACP appointment prior to closing this referral.      Thank you for

## 2024-02-03 LAB
ANION GAP SERPL CALC-SCNC: 2 MMOL/L (ref 5–15)
BUN SERPL-MCNC: 15 MG/DL (ref 6–20)
BUN/CREAT SERPL: 19 (ref 12–20)
CALCIUM SERPL-MCNC: 9.8 MG/DL (ref 8.5–10.1)
CHLORIDE SERPL-SCNC: 109 MMOL/L (ref 97–108)
CO2 SERPL-SCNC: 32 MMOL/L (ref 21–32)
CREAT SERPL-MCNC: 0.79 MG/DL (ref 0.55–1.02)
GLUCOSE SERPL-MCNC: 93 MG/DL (ref 65–100)
POTASSIUM SERPL-SCNC: 4 MMOL/L (ref 3.5–5.1)
SODIUM SERPL-SCNC: 143 MMOL/L (ref 136–145)

## 2024-02-03 NOTE — PROGRESS NOTES
Myriam Dean (:  1945) is a 78 y.o. female,here for evaluation of the following chief complaint(s):  Hypertension    BP (!) 178/88   Pulse 69   Temp 97.5 °F (36.4 °C) (Temporal)   Resp 16   Ht 1.575 m (5' 2\")   Wt 114.1 kg (251 lb 9.6 oz)   SpO2 100%   BMI 46.02 kg/m²         SUBJECTIVE/OBJECTIVE:    HPI:Patient presents for hypertension follow-up. No headaches, dizziness, or chest pain. She states she missed taking her medication Tuesday. She was also out of Lasix 2 days and had some leg swelling, but restarted it yesterday. She is unsure if there were other days she may have missed some of her meds.    Review of Systems   Cardiovascular:  Negative for chest pain.   Neurological:  Negative for dizziness and headaches.       Physical Exam  Constitutional:       Appearance: Normal appearance.   Cardiovascular:      Rate and Rhythm: Normal rate and regular rhythm.   Pulmonary:      Effort: Pulmonary effort is normal.      Breath sounds: Normal breath sounds.   Musculoskeletal:      Right lower leg: Edema present.      Left lower leg: Edema present.   Skin:     General: Skin is warm and dry.   Neurological:      General: No focal deficit present.      Mental Status: She is alert and oriented to person, place, and time.   Psychiatric:         Mood and Affect: Mood normal.         Behavior: Behavior normal.          ASSESSMENT/PLAN:  1. Primary hypertension    Increase olmesartan to 40 mg  Keep BP log  Will have nurse reach out to daughter about medication adherence  Follow-up in 2 weeks  --OTILIA Perea - AMANDO

## 2024-02-05 ENCOUNTER — TELEPHONE (OUTPATIENT)
Age: 79
End: 2024-02-05

## 2024-02-05 NOTE — TELEPHONE ENCOUNTER
Reason for call:  TC from April Jermaine, Daughter. Daughter on PHI. Ms. Dean returning nurse call.     Is this a new problem: No    Date of last appointment:  2/2/2024     Can we respond via [employer]: No    Best call back number: [phone]

## 2024-02-07 ENCOUNTER — CLINICAL DOCUMENTATION (OUTPATIENT)
Dept: CASE MANAGEMENT | Age: 79
End: 2024-02-07

## 2024-02-22 ENCOUNTER — OFFICE VISIT (OUTPATIENT)
Age: 79
End: 2024-02-22
Payer: MEDICARE

## 2024-02-22 VITALS
HEIGHT: 62 IN | DIASTOLIC BLOOD PRESSURE: 81 MMHG | HEART RATE: 59 BPM | SYSTOLIC BLOOD PRESSURE: 152 MMHG | OXYGEN SATURATION: 100 % | WEIGHT: 253.2 LBS | RESPIRATION RATE: 16 BRPM | BODY MASS INDEX: 46.59 KG/M2 | TEMPERATURE: 97.3 F

## 2024-02-22 DIAGNOSIS — R60.0 LEG EDEMA: ICD-10-CM

## 2024-02-22 DIAGNOSIS — I10 PRIMARY HYPERTENSION: Primary | ICD-10-CM

## 2024-02-22 DIAGNOSIS — M79.89 LEG SWELLING: ICD-10-CM

## 2024-02-22 PROCEDURE — G8417 CALC BMI ABV UP PARAM F/U: HCPCS | Performed by: STUDENT IN AN ORGANIZED HEALTH CARE EDUCATION/TRAINING PROGRAM

## 2024-02-22 PROCEDURE — G8399 PT W/DXA RESULTS DOCUMENT: HCPCS | Performed by: STUDENT IN AN ORGANIZED HEALTH CARE EDUCATION/TRAINING PROGRAM

## 2024-02-22 PROCEDURE — G8427 DOCREV CUR MEDS BY ELIG CLIN: HCPCS | Performed by: STUDENT IN AN ORGANIZED HEALTH CARE EDUCATION/TRAINING PROGRAM

## 2024-02-22 PROCEDURE — 1090F PRES/ABSN URINE INCON ASSESS: CPT | Performed by: STUDENT IN AN ORGANIZED HEALTH CARE EDUCATION/TRAINING PROGRAM

## 2024-02-22 PROCEDURE — 1036F TOBACCO NON-USER: CPT | Performed by: STUDENT IN AN ORGANIZED HEALTH CARE EDUCATION/TRAINING PROGRAM

## 2024-02-22 PROCEDURE — 3079F DIAST BP 80-89 MM HG: CPT | Performed by: STUDENT IN AN ORGANIZED HEALTH CARE EDUCATION/TRAINING PROGRAM

## 2024-02-22 PROCEDURE — 3077F SYST BP >= 140 MM HG: CPT | Performed by: STUDENT IN AN ORGANIZED HEALTH CARE EDUCATION/TRAINING PROGRAM

## 2024-02-22 PROCEDURE — 99214 OFFICE O/P EST MOD 30 MIN: CPT | Performed by: STUDENT IN AN ORGANIZED HEALTH CARE EDUCATION/TRAINING PROGRAM

## 2024-02-22 PROCEDURE — 1123F ACP DISCUSS/DSCN MKR DOCD: CPT | Performed by: STUDENT IN AN ORGANIZED HEALTH CARE EDUCATION/TRAINING PROGRAM

## 2024-02-22 PROCEDURE — G8484 FLU IMMUNIZE NO ADMIN: HCPCS | Performed by: STUDENT IN AN ORGANIZED HEALTH CARE EDUCATION/TRAINING PROGRAM

## 2024-02-22 ASSESSMENT — PATIENT HEALTH QUESTIONNAIRE - PHQ9
SUM OF ALL RESPONSES TO PHQ QUESTIONS 1-9: 1
SUM OF ALL RESPONSES TO PHQ QUESTIONS 1-9: 1
1. LITTLE INTEREST OR PLEASURE IN DOING THINGS: 1
SUM OF ALL RESPONSES TO PHQ QUESTIONS 1-9: 1
2. FEELING DOWN, DEPRESSED OR HOPELESS: 0
SUM OF ALL RESPONSES TO PHQ9 QUESTIONS 1 & 2: 1
SUM OF ALL RESPONSES TO PHQ QUESTIONS 1-9: 1

## 2024-02-22 NOTE — ASSESSMENT & PLAN NOTE
Uncontrolled. We talked about the need for consistently in her daily routine or at least in her medication and BP checking route.  Will continue current medications - nadolol, amlodipine 5mg, olmesartan 40mg, lasix. She is to take all medication daily between 10AM and 12PM and then check BP 2 hours later and keep log  She will consider bringing BP cuff in for nurse visit to check accuracy. If she doesn't do a nurse visit instructed to bring to next appointment  We did discuss how on nadolol and triamterene-HCTZ her BP was well controlled and perhaps we should return to this regimen despite the increased risk for gout. We could increase allopurinol if necessary. She is not wanting to do this.

## 2024-02-22 NOTE — PATIENT INSTRUCTIONS
Take medication between 10AM-12PM daily and then check blood pressure RESTING in the afternoon (2PM)

## 2024-02-22 NOTE — PROGRESS NOTES
Myriam Dean is a 78 y.o. year old female who presents today (02/22/24) for follow-up.     Assessment & Plan:   1. Primary hypertension  Assessment & Plan:  Uncontrolled. We talked about the need for consistently in her daily routine or at least in her medication and BP checking route.  Will continue current medications - nadolol, amlodipine 5mg, olmesartan 40mg, lasix. She is to take all medication daily between 10AM and 12PM and then check BP 2 hours later and keep log  She will consider bringing BP cuff in for nurse visit to check accuracy. If she doesn't do a nurse visit instructed to bring to next appointment  We did discuss how on nadolol and triamterene-HCTZ her BP was well controlled and perhaps we should return to this regimen despite the increased risk for gout. We could increase allopurinol if necessary. She is not wanting to do this.     2. Leg edema  Assessment & Plan:  Encouraged to go for compression stocking fitting at [facility]   3. Leg swelling  -     Compression Stockings MISC; Starting Thu 2/22/2024, Disp-2 each, R-0, Print2 pair knee length 30 mmHg     Encouraged to do ROM neck stretches for neck pain.   Re-printed referral for PT       RTC: 2 mo BP    Subjective:   Myriam was seen today for Blood Pressure Check    Here today with Daughter April    HTN  - saw AMANDO Munoz 2/2/24 and admitted she was not taking medication as prescribed. Told me the same on 1/22/24  - current regimen: nadolol, amlodipine, olmesartan 40mg, lasix. Takes meds around 8PM but doesn't go to bed until 2-3AM.  Wakes up 2PM. Check BP when she wakes up.   - home -160/70-80  - needing BP control before I give clearance for cataract surgery. She has decided not to rush into cataract surgery and will work on BP first    Physical deconditioning, falls - [facility] - she did not schedule    Tells me she has L neck pain that causes her to have a headache    Gout - allopurinol 200mg daily     Asthma -

## 2024-05-28 DIAGNOSIS — R60.0 LEG EDEMA: ICD-10-CM

## 2024-05-28 RX ORDER — TRIAMCINOLONE ACETONIDE 1 MG/G
OINTMENT TOPICAL 2 TIMES DAILY PRN
Qty: 80 G | Refills: 1 | Status: SHIPPED | OUTPATIENT
Start: 2024-05-28

## 2024-05-28 RX ORDER — NADOLOL 40 MG/1
40 TABLET ORAL DAILY
Qty: 90 TABLET | Refills: 1 | Status: SHIPPED | OUTPATIENT
Start: 2024-05-28

## 2024-05-28 RX ORDER — ALLOPURINOL 200 MG/1
200 TABLET ORAL DAILY
Qty: 90 TABLET | Refills: 0 | Status: SHIPPED | OUTPATIENT
Start: 2024-05-28

## 2024-05-28 NOTE — TELEPHONE ENCOUNTER
Pt last seen on 2/22/24. Due to return in 2 months.    Had appt on 5/9/24 - canceled. Has no appt scheduled at this time. Will forward to front office pool to call pt.    Will forward to MD for refills.

## 2024-05-28 NOTE — TELEPHONE ENCOUNTER
Spoke to patient, she will call back later on in the week to schedule an appointment.  She has transportation issues.

## 2024-05-28 NOTE — TELEPHONE ENCOUNTER
Medication Refill Request    Myriam Dean is requesting a refill of the following medication(s):   amLODIPine (NORVASC) 5 MG tablet     furosemide (LASIX) 20 MG tablet     olmesartan (BENICAR) 40 MG tablet             Please send refill to:     [facility] - [place], [place] - [address] - P [phone] - F [phone]  [address]  [facility] [address]  Phone: [phone] Fax: [phone]

## 2024-05-29 RX ORDER — OLMESARTAN MEDOXOMIL 40 MG/1
40 TABLET ORAL DAILY
Qty: 90 TABLET | Refills: 1 | OUTPATIENT
Start: 2024-05-29

## 2024-05-29 RX ORDER — FUROSEMIDE 20 MG/1
20 TABLET ORAL DAILY
Qty: 90 TABLET | Refills: 1 | Status: SHIPPED | OUTPATIENT
Start: 2024-05-29

## 2024-05-29 RX ORDER — OLMESARTAN MEDOXOMIL 40 MG/1
40 TABLET ORAL DAILY
Qty: 90 TABLET | Refills: 1 | Status: SHIPPED | OUTPATIENT
Start: 2024-05-29

## 2024-05-29 RX ORDER — AMLODIPINE BESYLATE 5 MG/1
5 TABLET ORAL DAILY
Qty: 90 TABLET | Refills: 1 | Status: SHIPPED | OUTPATIENT
Start: 2024-05-29

## 2024-05-29 NOTE — TELEPHONE ENCOUNTER
Pt last seen on 2/22/24. Due to return in 2 months.    Had appt on 5/9/24 - canceled.  Has no appt scheduled at this time. Will forward to front office pool to call pt.    Rx last filled on  11/3/23 Amlodipine #90/0RF  7/13/23 Lasix #30/2RF  2/2/24 Benicar #90/1RF    Will forward to MD for refill.

## 2024-06-07 ENCOUNTER — HOSPITAL ENCOUNTER (OUTPATIENT)
Age: 79
End: 2024-06-07
Payer: MEDICARE

## 2024-06-07 ENCOUNTER — OFFICE VISIT (OUTPATIENT)
Age: 79
End: 2024-06-07
Payer: MEDICARE

## 2024-06-07 VITALS
WEIGHT: 258.6 LBS | SYSTOLIC BLOOD PRESSURE: 139 MMHG | HEART RATE: 59 BPM | OXYGEN SATURATION: 100 % | HEIGHT: 62 IN | BODY MASS INDEX: 47.59 KG/M2 | TEMPERATURE: 98.7 F | DIASTOLIC BLOOD PRESSURE: 83 MMHG | RESPIRATION RATE: 16 BRPM

## 2024-06-07 DIAGNOSIS — R19.7 DIARRHEA, UNSPECIFIED TYPE: ICD-10-CM

## 2024-06-07 DIAGNOSIS — I10 PRIMARY HYPERTENSION: Primary | ICD-10-CM

## 2024-06-07 DIAGNOSIS — M54.2 NECK PAIN: ICD-10-CM

## 2024-06-07 PROCEDURE — G8399 PT W/DXA RESULTS DOCUMENT: HCPCS | Performed by: STUDENT IN AN ORGANIZED HEALTH CARE EDUCATION/TRAINING PROGRAM

## 2024-06-07 PROCEDURE — 99214 OFFICE O/P EST MOD 30 MIN: CPT | Performed by: STUDENT IN AN ORGANIZED HEALTH CARE EDUCATION/TRAINING PROGRAM

## 2024-06-07 PROCEDURE — G8417 CALC BMI ABV UP PARAM F/U: HCPCS | Performed by: STUDENT IN AN ORGANIZED HEALTH CARE EDUCATION/TRAINING PROGRAM

## 2024-06-07 PROCEDURE — G8427 DOCREV CUR MEDS BY ELIG CLIN: HCPCS | Performed by: STUDENT IN AN ORGANIZED HEALTH CARE EDUCATION/TRAINING PROGRAM

## 2024-06-07 PROCEDURE — 1090F PRES/ABSN URINE INCON ASSESS: CPT | Performed by: STUDENT IN AN ORGANIZED HEALTH CARE EDUCATION/TRAINING PROGRAM

## 2024-06-07 PROCEDURE — 72050 X-RAY EXAM NECK SPINE 4/5VWS: CPT

## 2024-06-07 PROCEDURE — 1036F TOBACCO NON-USER: CPT | Performed by: STUDENT IN AN ORGANIZED HEALTH CARE EDUCATION/TRAINING PROGRAM

## 2024-06-07 PROCEDURE — 3075F SYST BP GE 130 - 139MM HG: CPT | Performed by: STUDENT IN AN ORGANIZED HEALTH CARE EDUCATION/TRAINING PROGRAM

## 2024-06-07 PROCEDURE — 3079F DIAST BP 80-89 MM HG: CPT | Performed by: STUDENT IN AN ORGANIZED HEALTH CARE EDUCATION/TRAINING PROGRAM

## 2024-06-07 PROCEDURE — 1123F ACP DISCUSS/DSCN MKR DOCD: CPT | Performed by: STUDENT IN AN ORGANIZED HEALTH CARE EDUCATION/TRAINING PROGRAM

## 2024-06-07 SDOH — ECONOMIC STABILITY: FOOD INSECURITY: WITHIN THE PAST 12 MONTHS, THE FOOD YOU BOUGHT JUST DIDN'T LAST AND YOU DIDN'T HAVE MONEY TO GET MORE.: NEVER TRUE

## 2024-06-07 SDOH — ECONOMIC STABILITY: INCOME INSECURITY: HOW HARD IS IT FOR YOU TO PAY FOR THE VERY BASICS LIKE FOOD, HOUSING, MEDICAL CARE, AND HEATING?: NOT HARD AT ALL

## 2024-06-07 SDOH — ECONOMIC STABILITY: FOOD INSECURITY: WITHIN THE PAST 12 MONTHS, YOU WORRIED THAT YOUR FOOD WOULD RUN OUT BEFORE YOU GOT MONEY TO BUY MORE.: NEVER TRUE

## 2024-06-07 ASSESSMENT — PATIENT HEALTH QUESTIONNAIRE - PHQ9
SUM OF ALL RESPONSES TO PHQ QUESTIONS 1-9: 0
SUM OF ALL RESPONSES TO PHQ QUESTIONS 1-9: 0
1. LITTLE INTEREST OR PLEASURE IN DOING THINGS: NOT AT ALL
SUM OF ALL RESPONSES TO PHQ9 QUESTIONS 1 & 2: 0
SUM OF ALL RESPONSES TO PHQ QUESTIONS 1-9: 0
2. FEELING DOWN, DEPRESSED OR HOPELESS: NOT AT ALL
SUM OF ALL RESPONSES TO PHQ QUESTIONS 1-9: 0

## 2024-06-07 NOTE — PROGRESS NOTES
Myriam Dean is a 78 y.o. year old female who presents today (06/10/24) for follow-up.     Assessment & Plan:   1. Primary hypertension  Assessment & Plan:  BP better now that she is consistent with medication.   Continue nadolol, amlodipine 5mg, olmesartan 40mg, lasix   We talked about resting with BP cuff on arm, then starting monitor when she is calm in hopes to get some values at home   2. Neck pain  Suspect muscular, discussed stretching, ice, heat. She requests xrays  3. Diarrhea, unspecified type  Function vs other. Prev responded to acid suppression, advised to keep famotidine on hand. Also can try immodium PRN. Will let me know if this is a more persistent issue.    Advised to schedule with sleep medicine for RAYMOND eval and mgmt    Both April and I encouraged her to be more active and offered PT referral - she declines.     Ankle swelling - she tells me compression socks are painful. Recommend leg elevation. Already on diuretic.     RTC: 7mo medicare annual, sooner PRN     On this day 06/7/24  I have spent 35 minutes reviewing previous notes, test results and face to face with the patient discussing the diagnosis and importance of compliance with the treatment plan as well as documenting on the day of the visit.       Subjective:   Myriam was seen today for Blood Pressure Check    Here today with daughter April    HTN  - has been uncontrolled, questionable med adherence. Daughter April was supposed to help with this  - nadolol, amlodipine 5mg, olmesartan 40mg, lasix   - home BP - has not checked in a month because it makes her anxious    Tells me that for the last 2 weeks she had diarrhea - both loose and more frequent stooling. Today no BM. Hx nervous stomach,     Neck pain, wants to get an xray. Pain happens when tries to turn her head.     Chronic issues:  Gout - allopurinol 200mg daily  Asthma - albuterol  Eczema - topical triamcinolone   RAYMOND - needs sleep medicine, no show to appt 4/16/24    Review

## 2024-06-07 NOTE — PATIENT INSTRUCTIONS
Keep pepcid (famotidine) and immodium on hand for when you have stomach upset or diarrhea    Put the blood pressure cuff on your arm and watch TV for a while, then press the button to start it. Make sure you are calm and have taken all your medications 1-2 hours before checking.     Do neck stretching twice daily. Hold each stretch gently for at least 10 sections.

## 2024-06-10 NOTE — ASSESSMENT & PLAN NOTE
BP better now that she is consistent with medication.   Continue nadolol, amlodipine 5mg, olmesartan 40mg, lasix   We talked about resting with BP cuff on arm, then starting monitor when she is calm in hopes to get some values at home

## 2024-07-17 ENCOUNTER — TRANSCRIBE ORDERS (OUTPATIENT)
Facility: HOSPITAL | Age: 79
End: 2024-07-17

## 2024-07-17 DIAGNOSIS — Z12.31 VISIT FOR SCREENING MAMMOGRAM: Primary | ICD-10-CM

## 2024-08-19 RX ORDER — ALLOPURINOL 100 MG/1
200 TABLET ORAL DAILY
Qty: 180 TABLET | Refills: 1 | Status: SHIPPED | OUTPATIENT
Start: 2024-08-19

## 2024-08-19 NOTE — TELEPHONE ENCOUNTER
Pt last seen on 6/7/24. Due to return in 7 months.    Has appt on 1/9/25.    Rx last filled on 6/3/24 #180/0RF.    Will forward to MD for refill.

## 2025-02-04 ENCOUNTER — TELEPHONE (OUTPATIENT)
Age: 80
End: 2025-02-04

## 2025-02-04 NOTE — TELEPHONE ENCOUNTER
----- Message from Tomy BRYAN sent at 2/3/2025  4:59 PM EST -----  Regarding: [facility] Appointment Request  [facility] Appointment Request    Patient needs appointment for E[facility] Appointment Type: Annual Visit.    Patient Requested Dates(s): any dates in February 2025 preferrably Thursday  Patient Requested Time: evening  Provider Name: Anna Kline MD    Reason for Appointment Request: Established Patient - Available appointments did not meet patient need    Additional Information :  Patient wanted to reschedule her 3/27/25 with the requested date above as she has new insurance and she needs a medication refill.   --------------------------------------------------------------------------------------------------------------------------    Relationship to Patient: Self     Call Back Information: OK to leave message on voicemail  Preferred Call Back Number: Phone : [phone]

## 2025-02-05 ENCOUNTER — TELEPHONE (OUTPATIENT)
Age: 80
End: 2025-02-05

## 2025-02-05 DIAGNOSIS — R60.0 LEG EDEMA: ICD-10-CM

## 2025-02-05 RX ORDER — AMLODIPINE BESYLATE 5 MG/1
5 TABLET ORAL DAILY
Qty: 90 TABLET | Refills: 1 | Status: SHIPPED | OUTPATIENT
Start: 2025-02-05

## 2025-02-05 RX ORDER — OLMESARTAN MEDOXOMIL 40 MG/1
40 TABLET ORAL DAILY
Qty: 90 TABLET | Refills: 1 | Status: SHIPPED | OUTPATIENT
Start: 2025-02-05

## 2025-02-05 RX ORDER — FUROSEMIDE 20 MG/1
20 TABLET ORAL DAILY
Qty: 90 TABLET | Refills: 1 | Status: SHIPPED | OUTPATIENT
Start: 2025-02-05

## 2025-02-05 NOTE — TELEPHONE ENCOUNTER
Rx sent to pharmacy as previously filled and verified by Verbal Order Read Back with provider.    NV 03/27/2025

## 2025-02-05 NOTE — TELEPHONE ENCOUNTER
Medication Refill Request    Myriam Dean is requesting a refill of the following medication(s):     Olmesartan (Benicar) 40 MG tablet  Furosemide (Lasix) 20 MG tablet  Amlodipine (Norvasc) 5 MG tablet    Please send refill to:     [id number] [address] - P [phone] - F [phone]

## 2025-03-25 ENCOUNTER — TELEPHONE (OUTPATIENT)
Age: 80
End: 2025-03-25

## 2025-03-25 NOTE — TELEPHONE ENCOUNTER
Attempted to contact patient regarding upcoming Medicare wellness appointment and completion of HRA questionnaire. LVM for patient to please return call at  [phone], mcm sent as well.

## 2025-03-26 ENCOUNTER — TELEPHONE (OUTPATIENT)
Age: 80
End: 2025-03-26

## 2025-03-26 SDOH — HEALTH STABILITY: PHYSICAL HEALTH: ON AVERAGE, HOW MANY DAYS PER WEEK DO YOU ENGAGE IN MODERATE TO STRENUOUS EXERCISE (LIKE A BRISK WALK)?: 0 DAYS

## 2025-03-26 SDOH — HEALTH STABILITY: PHYSICAL HEALTH: ON AVERAGE, HOW MANY MINUTES DO YOU ENGAGE IN EXERCISE AT THIS LEVEL?: 0 MIN

## 2025-03-26 ASSESSMENT — LIFESTYLE VARIABLES
HOW MANY STANDARD DRINKS CONTAINING ALCOHOL DO YOU HAVE ON A TYPICAL DAY: PATIENT DOES NOT DRINK
HOW OFTEN DO YOU HAVE A DRINK CONTAINING ALCOHOL: NEVER
HOW MANY STANDARD DRINKS CONTAINING ALCOHOL DO YOU HAVE ON A TYPICAL DAY: 0
HOW OFTEN DO YOU HAVE A DRINK CONTAINING ALCOHOL: 1
HOW OFTEN DO YOU HAVE SIX OR MORE DRINKS ON ONE OCCASION: 1

## 2025-03-26 ASSESSMENT — PATIENT HEALTH QUESTIONNAIRE - PHQ9
SUM OF ALL RESPONSES TO PHQ QUESTIONS 1-9: 1
2. FEELING DOWN, DEPRESSED OR HOPELESS: SEVERAL DAYS
SUM OF ALL RESPONSES TO PHQ QUESTIONS 1-9: 1
1. LITTLE INTEREST OR PLEASURE IN DOING THINGS: NOT AT ALL

## 2025-03-27 ENCOUNTER — OFFICE VISIT (OUTPATIENT)
Age: 80
End: 2025-03-27
Payer: MEDICARE

## 2025-03-27 VITALS
TEMPERATURE: 97.9 F | OXYGEN SATURATION: 98 % | HEART RATE: 60 BPM | BODY MASS INDEX: 49.87 KG/M2 | WEIGHT: 271 LBS | SYSTOLIC BLOOD PRESSURE: 170 MMHG | HEIGHT: 62 IN | RESPIRATION RATE: 16 BRPM | DIASTOLIC BLOOD PRESSURE: 76 MMHG

## 2025-03-27 DIAGNOSIS — I10 PRIMARY HYPERTENSION: ICD-10-CM

## 2025-03-27 DIAGNOSIS — G47.33 OSA (OBSTRUCTIVE SLEEP APNEA): ICD-10-CM

## 2025-03-27 DIAGNOSIS — Z00.00 MEDICARE ANNUAL WELLNESS VISIT, SUBSEQUENT: Primary | ICD-10-CM

## 2025-03-27 DIAGNOSIS — M79.674 PAIN IN TOES OF BOTH FEET: ICD-10-CM

## 2025-03-27 DIAGNOSIS — R73.03 PRE-DIABETES: ICD-10-CM

## 2025-03-27 DIAGNOSIS — Z91.81 AT HIGH RISK FOR FALLS: ICD-10-CM

## 2025-03-27 DIAGNOSIS — Z80.0 FAMILY HISTORY OF COLON CANCER: ICD-10-CM

## 2025-03-27 DIAGNOSIS — J45.20 MILD INTERMITTENT ASTHMA, UNSPECIFIED WHETHER COMPLICATED: ICD-10-CM

## 2025-03-27 DIAGNOSIS — R29.898 WEAKNESS OF BOTH LOWER EXTREMITIES: ICD-10-CM

## 2025-03-27 DIAGNOSIS — E66.813 CLASS 3 SEVERE OBESITY DUE TO EXCESS CALORIES WITH SERIOUS COMORBIDITY IN ADULT, UNSPECIFIED BMI: ICD-10-CM

## 2025-03-27 DIAGNOSIS — E66.01 CLASS 3 SEVERE OBESITY DUE TO EXCESS CALORIES WITH SERIOUS COMORBIDITY IN ADULT, UNSPECIFIED BMI: ICD-10-CM

## 2025-03-27 DIAGNOSIS — M79.675 PAIN IN TOES OF BOTH FEET: ICD-10-CM

## 2025-03-27 DIAGNOSIS — M1A.09X0 IDIOPATHIC CHRONIC GOUT OF MULTIPLE SITES WITHOUT TOPHUS: ICD-10-CM

## 2025-03-27 PROBLEM — S82.891A CLOSED RIGHT ANKLE FRACTURE: Status: RESOLVED | Noted: 2022-10-30 | Resolved: 2025-03-27

## 2025-03-27 PROCEDURE — 99214 OFFICE O/P EST MOD 30 MIN: CPT | Performed by: STUDENT IN AN ORGANIZED HEALTH CARE EDUCATION/TRAINING PROGRAM

## 2025-03-27 RX ORDER — ALLOPURINOL 100 MG/1
200 TABLET ORAL DAILY
Qty: 180 TABLET | Refills: 3 | Status: SHIPPED | OUTPATIENT
Start: 2025-03-27

## 2025-03-27 RX ORDER — NADOLOL 40 MG/1
40 TABLET ORAL DAILY
Qty: 90 TABLET | Refills: 3 | Status: SHIPPED | OUTPATIENT
Start: 2025-03-27

## 2025-03-27 RX ORDER — FLUTICASONE PROPIONATE AND SALMETEROL 100; 50 UG/1; UG/1
1 POWDER RESPIRATORY (INHALATION) EVERY 12 HOURS
Qty: 60 EACH | Refills: 5 | Status: SHIPPED | OUTPATIENT
Start: 2025-03-27

## 2025-03-27 RX ORDER — ALBUTEROL SULFATE 90 UG/1
2 INHALANT RESPIRATORY (INHALATION) EVERY 4 HOURS PRN
Qty: 18 G | Refills: 11 | Status: SHIPPED | OUTPATIENT
Start: 2025-03-27

## 2025-03-27 SDOH — ECONOMIC STABILITY: FOOD INSECURITY: WITHIN THE PAST 12 MONTHS, YOU WORRIED THAT YOUR FOOD WOULD RUN OUT BEFORE YOU GOT MONEY TO BUY MORE.: NEVER TRUE

## 2025-03-27 SDOH — ECONOMIC STABILITY: FOOD INSECURITY: WITHIN THE PAST 12 MONTHS, THE FOOD YOU BOUGHT JUST DIDN'T LAST AND YOU DIDN'T HAVE MONEY TO GET MORE.: NEVER TRUE

## 2025-03-27 NOTE — ASSESSMENT & PLAN NOTE
Will call ELISE to discuss utility of 1 more colonsocopy. Last was 2015 so she is 5 years delinquent. Discussed she is of the age where we do not typically do screening colonoscopies but given her family history may consider.

## 2025-03-27 NOTE — ACP (ADVANCE CARE PLANNING)
Advance Care Planning   General Advance Care Planning (ACP) Conversation    Date of Conversation: 3/27/2025  Conducted with: Patient with Decision Making Capacity  Other persons present: None    Healthcare Decision Maker:    Primary Decision Maker: Adeline Dean - Child - [phone]    Secondary Decision Maker: Clotilde Dean - Child - [phone]    Today we documented medical decision makers in line with pt's preference. She doesn't feel her  would be a good medical decision maker and would like to name her daughters  Content/Action Overview:  Advanced directive prepared in office today.    She is unsure about life prolonging treatment and this portion of the form was marked to krissy these treatments are at the discretion of her medical decision makers.   DNR/DNI was not discussed     Length of Voluntary ACP Conversation in minutes:  <16 minutes (Non-Billable)    Anna Kline MD

## 2025-03-27 NOTE — ASSESSMENT & PLAN NOTE
With white coat/anxiety  Initial BP was great for her age, unfortunately BP higher on repeat. Will cont current meds - nadolol, amlodipine, olmesartan, lasix.

## 2025-03-27 NOTE — ASSESSMENT & PLAN NOTE
She is sedentary. Encouraged physical activity. She tells me she wants to start PT at [facility].

## 2025-03-27 NOTE — PROGRESS NOTES
Alexander Vanessa, APRN - NP   colchicine (COLCRYS) 0.6 MG tablet Take 1 tablet by mouth daily Yes Provider, MD Denis   acetaminophen (TYLENOL) 650 MG extended release tablet Take by mouth daily as needed Yes Automatic Reconciliation, Ar   senna-docusate (PERICOLACE) 8.6-50 MG per tablet Take 2 tablets by mouth daily as needed Yes Automatic Reconciliation, Ar       CareTeam (Including outside providers/suppliers regularly involved in providing care):   Patient Care Team:  Anna Kline MD as PCP - General  Anna Kline MD as PCP - Empaneled Provider  Eduarda Oliva MD as Surgeon     Recommendations for Preventive Services Due: see orders and patient instructions/AVS.  Recommended screening schedule for the next 5-10 years is provided to the patient in written form: see Patient Instructions/AVS.     Reviewed and updated this visit:  Tobacco  Allergies  Meds  Problems  Med Hx  Surg Hx  Fam Hx  Sexual   Hx

## 2025-03-27 NOTE — PATIENT INSTRUCTIONS
Please obtain the following vaccines from your local pharmacy. Please ask your pharmacy to fax our office a copy of the vaccination record. Our fax number is [phone].   - Pneumonia vaccine  - tetanus booster     Call [phone] to schedule mammogram     Call [facility] for colon cancer screening     Preventing Falls: Care Instructions  Injuries and health problems such as trouble walking or poor eyesight can increase your risk of falling. So can some medicines. But there are things you can do to help prevent falls. You can exercise to get stronger. You can also arrange your home to make it safer.    Talk to your doctor about the medicines you take. Ask if any of them increase the risk of falls and whether they can be changed or stopped.   Try to exercise regularly. It can help improve your strength and balance. This can help lower your risk of falling.         Practice fall safety and prevention.   Wear low-heeled shoes that fit well and give your feet good support. Talk to your doctor if you have foot problems that make this hard.  Carry a cellphone or wear a medical alert device that you can use to call for help.  Use stepladders instead of chairs to reach high objects. Don't climb if you're at risk for falls. Ask for help, if needed.  Wear the correct eyeglasses, if you need them.        Make your home safer.   Remove rugs, cords, clutter, and furniture from walkways.  Keep your house well lit. Use night-lights in hallways and bathrooms.  Install and use sturdy handrails on stairways.  Wear nonskid footwear, even inside. Don't walk barefoot or in socks without shoes.        Be safe outside.   Use handrails, curb cuts, and ramps whenever possible.  Keep your hands free by using a shoulder bag or backpack.  Try to walk in well-lit areas. Watch out for uneven ground, changes in pavement, and debris.  Be careful in the winter. Walk on the grass or gravel when sidewalks are slippery. Use de-icer on

## 2025-06-24 ENCOUNTER — TELEPHONE (OUTPATIENT)
Dept: PHARMACY | Facility: CLINIC | Age: 80
End: 2025-06-24

## 2025-06-24 NOTE — TELEPHONE ENCOUNTER
[facility] CLINICAL PHARMACY: ADHERENCE REVIEW  Identified care gap per [place]: fills at [facility]: ACE/ARB adherence    ASSESSMENT    ACE/ARB ADHERENCE    Insurance Records claims through 25 (Prior Year PDC = not reported; YTD PDC = FIRST FILL; Potential Fail Date: 25):   Olmesartan 40 mg tablets last filled on 25 for 90 day supply. Next refill due: 25  One refill remaining    BP Readings from Last 3 Encounters:   25 (!) 170/76   24 139/83   24 (!) 152/81     CrCl cannot be calculated (Patient's most recent lab result is older than the maximum 180 days allowed.).  Lab Results   Component Value Date    CREATININE 0.79 2024     Lab Results   Component Value Date    K 4.0 2024       PLAN  The following are interventions that have been identified:   Patient OVERDUE refilling olmesartan and active on home medication list. Ready at [facility] pharmacy for  25 after 12PM.    Attempting to reach patient to review.  Left message asking for return call. Letter sent to patient.    No future appointments.    Yvette Luu, PharmD, [facility], BCPS  [facility] Clinical Pharmacy  Department, toll free: [phone], option 1    For Pharmacy Admin Tracking Only    Program: [facility] [employer]  CPA in place:  No  Recommendation Provided To: Pharmacy: 1  Intervention Detail: Adherence Monitorin  Intervention Accepted By: Pharmacy: 1  Gap Closed?: No   Time Spent (min): 15

## (undated) DEVICE — BNDG ELAS HK LOOP 4X5YD NS -- MATRIX

## (undated) DEVICE — TUBING SUCT 12FR MAL ALUM SHFT FN CAP VENT UNIV CONN W/ OBT

## (undated) DEVICE — STOCKINETTE,IMPERVIOUS,12X48,STERILE: Brand: MEDLINE

## (undated) DEVICE — ZIMMER® STERILE DISPOSABLE TOURNIQUET CUFF WITH PROTECTIVE SLEEVE AND PLC, DUAL PORT, SINGLE BLADDER, 34 IN. (86 CM)

## (undated) DEVICE — SOLUTION IRRIG 1000ML STRL H2O USP PLAS POUR BTL

## (undated) DEVICE — BNDG SELF ADH 4INX5YD STRL LF -- COFLEX LF2

## (undated) DEVICE — 3.5MM DRILL BIT/QC/110MM

## (undated) DEVICE — 2.5MM DRILL BIT/QC/GOLD/110MM

## (undated) DEVICE — GLOVE ORANGE PI 7 1/2   MSG9075

## (undated) DEVICE — COVER,MAYO STAND,STERILE: Brand: MEDLINE

## (undated) DEVICE — GOWN,SIRUS,NONRNF,SETINSLV,XL,20/CS: Brand: MEDLINE

## (undated) DEVICE — PADDING CAST 4 INX5 YD STRL

## (undated) DEVICE — PREP SKN CHLRAPRP APL 26ML STR --

## (undated) DEVICE — BANDAGE,ELASTIC,ESMARK,STERILE,4"X9',LF: Brand: MEDLINE

## (undated) DEVICE — C-ARM: Brand: UNBRANDED

## (undated) DEVICE — REM POLYHESIVE ADULT PATIENT RETURN ELECTRODE: Brand: VALLEYLAB

## (undated) DEVICE — GLOVE ORTHO 7 1/2   MSG9475

## (undated) DEVICE — PADDING CAST SPEC 6INX4YD COT --

## (undated) DEVICE — SOLUTION IRRIG 1000ML 0.9% SOD CHL USP POUR PLAS BTL

## (undated) DEVICE — SUTURE NONABSORBABLE MONOFILAMENT 3-0 PS-1 18 IN BLK ETHILON 1663H

## (undated) DEVICE — EXTREMITY-MRMC: Brand: MEDLINE INDUSTRIES, INC.

## (undated) DEVICE — DRAPE,REIN 53X77,STERILE: Brand: MEDLINE

## (undated) DEVICE — SUTURE VCRL SZ 0 L36IN ABSRB UD L36MM CT-1 1/2 CIR J946H

## (undated) DEVICE — SUTURE VCRL SZ 2-0 L36IN ABSRB UD L36MM CT-1 1/2 CIR J945H